# Patient Record
Sex: FEMALE
[De-identification: names, ages, dates, MRNs, and addresses within clinical notes are randomized per-mention and may not be internally consistent; named-entity substitution may affect disease eponyms.]

---

## 2022-10-19 PROBLEM — Z00.00 ENCOUNTER FOR PREVENTIVE HEALTH EXAMINATION: Status: ACTIVE | Noted: 2022-10-19

## 2022-11-02 ENCOUNTER — LABORATORY RESULT (OUTPATIENT)
Age: 57
End: 2022-11-02

## 2022-11-02 ENCOUNTER — APPOINTMENT (OUTPATIENT)
Dept: OBGYN | Facility: CLINIC | Age: 57
End: 2022-11-02

## 2022-11-02 VITALS
SYSTOLIC BLOOD PRESSURE: 108 MMHG | HEIGHT: 64 IN | DIASTOLIC BLOOD PRESSURE: 74 MMHG | BODY MASS INDEX: 26.98 KG/M2 | WEIGHT: 158 LBS

## 2022-11-02 DIAGNOSIS — J45.909 UNSPECIFIED ASTHMA, UNCOMPLICATED: ICD-10-CM

## 2022-11-02 DIAGNOSIS — N81.10 CYSTOCELE, UNSPECIFIED: ICD-10-CM

## 2022-11-02 DIAGNOSIS — E11.9 TYPE 2 DIABETES MELLITUS W/OUT COMPLICATIONS: ICD-10-CM

## 2022-11-02 DIAGNOSIS — E78.00 PURE HYPERCHOLESTEROLEMIA, UNSPECIFIED: ICD-10-CM

## 2022-11-02 DIAGNOSIS — R32 UNSPECIFIED URINARY INCONTINENCE: ICD-10-CM

## 2022-11-02 DIAGNOSIS — N95.2 POSTMENOPAUSAL ATROPHIC VAGINITIS: ICD-10-CM

## 2022-11-02 PROCEDURE — 99203 OFFICE O/P NEW LOW 30 MIN: CPT

## 2022-11-02 RX ORDER — ESTRADIOL 0.1 MG/G
0.1 CREAM VAGINAL
Qty: 1 | Refills: 3 | Status: ACTIVE | COMMUNITY
Start: 2022-11-02 | End: 1900-01-01

## 2022-11-03 PROBLEM — N81.10 FEMALE CYSTOCELE: Status: ACTIVE | Noted: 2022-11-03

## 2022-11-03 PROBLEM — R32 URINARY INCONTINENCE IN FEMALE: Status: ACTIVE | Noted: 2022-11-03

## 2022-11-03 NOTE — HISTORY OF PRESENT ILLNESS
[FreeTextEntry1] : pt comes for surgery . She has a long history of urinary stress incontinence with mild frequency and no loss of urine over night while sleeping . She also has tried pelvic floor exercises but was not doing them well for number of years . SHe also has vaginal dryness and sex is difficult because of the dryness . She has no back trauma , bleeding , bloating , hematuria or constipation .

## 2022-11-07 ENCOUNTER — LABORATORY RESULT (OUTPATIENT)
Age: 57
End: 2022-11-07

## 2022-11-21 ENCOUNTER — ASOB RESULT (OUTPATIENT)
Age: 57
End: 2022-11-21

## 2022-11-21 ENCOUNTER — APPOINTMENT (OUTPATIENT)
Dept: OBGYN | Facility: CLINIC | Age: 57
End: 2022-11-21

## 2022-11-21 PROCEDURE — 76830 TRANSVAGINAL US NON-OB: CPT

## 2024-10-29 ENCOUNTER — INPATIENT (INPATIENT)
Facility: HOSPITAL | Age: 59
LOS: 1 days | Discharge: HOME | DRG: 552 | End: 2024-10-31
Attending: STUDENT IN AN ORGANIZED HEALTH CARE EDUCATION/TRAINING PROGRAM | Admitting: STUDENT IN AN ORGANIZED HEALTH CARE EDUCATION/TRAINING PROGRAM
Payer: MEDICAID

## 2024-10-29 VITALS
RESPIRATION RATE: 18 BRPM | HEIGHT: 65 IN | HEART RATE: 100 BPM | WEIGHT: 162.04 LBS | OXYGEN SATURATION: 100 % | SYSTOLIC BLOOD PRESSURE: 151 MMHG | TEMPERATURE: 98 F | DIASTOLIC BLOOD PRESSURE: 91 MMHG

## 2024-10-29 LAB
ALBUMIN SERPL ELPH-MCNC: 4 G/DL — SIGNIFICANT CHANGE UP (ref 3.5–5)
ALP SERPL-CCNC: 67 U/L — SIGNIFICANT CHANGE UP (ref 40–120)
ALT FLD-CCNC: 26 U/L DA — SIGNIFICANT CHANGE UP (ref 10–60)
ANION GAP SERPL CALC-SCNC: 7 MMOL/L — SIGNIFICANT CHANGE UP (ref 5–17)
AST SERPL-CCNC: 25 U/L — SIGNIFICANT CHANGE UP (ref 10–40)
BASOPHILS # BLD AUTO: 0.03 K/UL — SIGNIFICANT CHANGE UP (ref 0–0.2)
BASOPHILS NFR BLD AUTO: 0.4 % — SIGNIFICANT CHANGE UP (ref 0–2)
BILIRUB SERPL-MCNC: 0.3 MG/DL — SIGNIFICANT CHANGE UP (ref 0.2–1.2)
BUN SERPL-MCNC: 30 MG/DL — HIGH (ref 7–18)
CALCIUM SERPL-MCNC: 10.3 MG/DL — SIGNIFICANT CHANGE UP (ref 8.4–10.5)
CHLORIDE SERPL-SCNC: 108 MMOL/L — SIGNIFICANT CHANGE UP (ref 96–108)
CO2 SERPL-SCNC: 25 MMOL/L — SIGNIFICANT CHANGE UP (ref 22–31)
CREAT SERPL-MCNC: 0.98 MG/DL — SIGNIFICANT CHANGE UP (ref 0.5–1.3)
EGFR: 66 ML/MIN/1.73M2 — SIGNIFICANT CHANGE UP
EOSINOPHIL # BLD AUTO: 0.16 K/UL — SIGNIFICANT CHANGE UP (ref 0–0.5)
EOSINOPHIL NFR BLD AUTO: 2.4 % — SIGNIFICANT CHANGE UP (ref 0–6)
GLUCOSE SERPL-MCNC: 116 MG/DL — HIGH (ref 70–99)
HCT VFR BLD CALC: 40.3 % — SIGNIFICANT CHANGE UP (ref 34.5–45)
HGB BLD-MCNC: 14.1 G/DL — SIGNIFICANT CHANGE UP (ref 11.5–15.5)
IMM GRANULOCYTES NFR BLD AUTO: 0.1 % — SIGNIFICANT CHANGE UP (ref 0–0.9)
LYMPHOCYTES # BLD AUTO: 3.18 K/UL — SIGNIFICANT CHANGE UP (ref 1–3.3)
LYMPHOCYTES # BLD AUTO: 47.1 % — HIGH (ref 13–44)
MCHC RBC-ENTMCNC: 30.7 PG — SIGNIFICANT CHANGE UP (ref 27–34)
MCHC RBC-ENTMCNC: 35 G/DL — SIGNIFICANT CHANGE UP (ref 32–36)
MCV RBC AUTO: 87.6 FL — SIGNIFICANT CHANGE UP (ref 80–100)
MONOCYTES # BLD AUTO: 0.55 K/UL — SIGNIFICANT CHANGE UP (ref 0–0.9)
MONOCYTES NFR BLD AUTO: 8.1 % — SIGNIFICANT CHANGE UP (ref 2–14)
NEUTROPHILS # BLD AUTO: 2.82 K/UL — SIGNIFICANT CHANGE UP (ref 1.8–7.4)
NEUTROPHILS NFR BLD AUTO: 41.9 % — LOW (ref 43–77)
NRBC # BLD: 0 /100 WBCS — SIGNIFICANT CHANGE UP (ref 0–0)
PLATELET # BLD AUTO: 255 K/UL — SIGNIFICANT CHANGE UP (ref 150–400)
POTASSIUM SERPL-MCNC: 3.9 MMOL/L — SIGNIFICANT CHANGE UP (ref 3.5–5.3)
POTASSIUM SERPL-SCNC: 3.9 MMOL/L — SIGNIFICANT CHANGE UP (ref 3.5–5.3)
PROT SERPL-MCNC: 7.6 G/DL — SIGNIFICANT CHANGE UP (ref 6–8.3)
RBC # BLD: 4.6 M/UL — SIGNIFICANT CHANGE UP (ref 3.8–5.2)
RBC # FLD: 12.2 % — SIGNIFICANT CHANGE UP (ref 10.3–14.5)
SODIUM SERPL-SCNC: 140 MMOL/L — SIGNIFICANT CHANGE UP (ref 135–145)
WBC # BLD: 6.75 K/UL — SIGNIFICANT CHANGE UP (ref 3.8–10.5)
WBC # FLD AUTO: 6.75 K/UL — SIGNIFICANT CHANGE UP (ref 3.8–10.5)

## 2024-10-29 PROCEDURE — 72131 CT LUMBAR SPINE W/O DYE: CPT | Mod: 26,MC

## 2024-10-29 PROCEDURE — 99285 EMERGENCY DEPT VISIT HI MDM: CPT

## 2024-10-29 RX ORDER — KETOROLAC TROMETHAMINE 30 MG/ML
15 INJECTION INTRAMUSCULAR; INTRAVENOUS ONCE
Refills: 0 | Status: DISCONTINUED | OUTPATIENT
Start: 2024-10-29 | End: 2024-10-29

## 2024-10-29 RX ORDER — METHOCARBAMOL 500 MG/1
1500 TABLET ORAL ONCE
Refills: 0 | Status: COMPLETED | OUTPATIENT
Start: 2024-10-29 | End: 2024-10-29

## 2024-10-29 RX ORDER — LIDOCAINE HYDROCHLORIDE 40 MG/ML
1 SOLUTION TOPICAL ONCE
Refills: 0 | Status: COMPLETED | OUTPATIENT
Start: 2024-10-29 | End: 2024-10-29

## 2024-10-29 RX ORDER — MORPHINE SULFATE 30 MG/1
4 TABLET, EXTENDED RELEASE ORAL ONCE
Refills: 0 | Status: DISCONTINUED | OUTPATIENT
Start: 2024-10-29 | End: 2024-10-29

## 2024-10-29 RX ORDER — ACETAMINOPHEN 500 MG
1000 TABLET ORAL ONCE
Refills: 0 | Status: COMPLETED | OUTPATIENT
Start: 2024-10-29 | End: 2024-10-29

## 2024-10-29 RX ADMIN — METHOCARBAMOL 1500 MILLIGRAM(S): 500 TABLET ORAL at 21:53

## 2024-10-29 RX ADMIN — LIDOCAINE HYDROCHLORIDE 1 PATCH: 40 SOLUTION TOPICAL at 21:53

## 2024-10-29 RX ADMIN — KETOROLAC TROMETHAMINE 15 MILLIGRAM(S): 30 INJECTION INTRAMUSCULAR; INTRAVENOUS at 21:53

## 2024-10-29 RX ADMIN — Medication 400 MILLIGRAM(S): at 23:46

## 2024-10-29 RX ADMIN — MORPHINE SULFATE 4 MILLIGRAM(S): 30 TABLET, EXTENDED RELEASE ORAL at 23:46

## 2024-10-29 NOTE — ED PROVIDER NOTE - OBJECTIVE STATEMENT
#[id number]    59-year-old female with past medical history asthma, GERD, hyperlipidemia presents with low back pain radiating down bilateral lower extremities, worsening over the past week.  Patient reports low back pain radiating down bilateral lower extremities right greater than left over the past few weeks, worsening of the past few days.  Patient states she saw her primary care doctor and was prescribed diclofenac with little improvement.  Denies any recent injury or trauma.  Denies any fevers, headache, vision change, chest pain, shortness of breath, abdominal pain, vomiting, diarrhea, dysuria, numbness, focal weakness, saddle anesthesia, bowel/bladder dysfunction, or rash.  Denies history of IV drug abuse.  Denies aspirin or anticoagulation use.  Denies any additional complaints.

## 2024-10-29 NOTE — ED PROVIDER NOTE - CLINICAL SUMMARY MEDICAL DECISION MAKING FREE TEXT BOX
Shaheen: 59-year-old female with past medical history asthma, GERD, hyperlipidemia presents with low back pain radiating down bilateral lower extremities, worsening over the past week.  Patient reports low back pain radiating down bilateral lower extremities right greater than left over the past few weeks, worsening of the past few days.  Patient states she saw her primary care doctor and was prescribed diclofenac with little improvement.  Denies any recent injury or trauma.  Denies any fevers, headache, vision change, chest pain, shortness of breath, abdominal pain, vomiting, diarrhea, dysuria, numbness, focal weakness, saddle anesthesia, bowel/bladder dysfunction, or rash.  Denies history of IV drug abuse.  Denies aspirin or anticoagulation use.  Physical exam per above. Likely lumbar radiculopathy, no signs of cauda equina syndrome or cord compression. Will obtain labs, imaging, provide supportive treatment with dispo pending workup.

## 2024-10-29 NOTE — ED PROVIDER NOTE - PHYSICAL EXAMINATION
CONSTITUTIONAL: non-toxic, uncomfortable appearing  SKIN: no rash, no petechiae.  EYES: PERRL, EOMI, pink conjunctiva, anicteric  ENT: tongue and uvular midline, no exudates, moist mucous membranes  NECK: Supple; no meningismus, no JVD  CARD: RRR, no murmurs, equal radial pulses bilaterally 2+  RESP: CTAB, no respiratory distress  ABD: Soft, non-tender, non-distended, no peritoneal signs  EXT: Normal ROM x4. No edema. DP 2+ bilaterally. Sensation grossly intact.   NEURO: Alert, oriented. Neuro exam nonfocal, equal strength bilaterally. Positive straight leg raise bilaterally.   PSYCH: Cooperative, appropriate.

## 2024-10-29 NOTE — ED PROVIDER NOTE - PROGRESS NOTE DETAILS
Carolyn-DO: Discussed labs/imaging results with patient.  Patient with persistent severe pain and difficulty ambulating despite multimodal analgesia.  Will admit, discussed with hospitalist and MAR regarding admission.  Patient agreeable with plan.

## 2024-10-30 DIAGNOSIS — Z98.891 HISTORY OF UTERINE SCAR FROM PREVIOUS SURGERY: Chronic | ICD-10-CM

## 2024-10-30 DIAGNOSIS — R26.2 DIFFICULTY IN WALKING, NOT ELSEWHERE CLASSIFIED: ICD-10-CM

## 2024-10-30 DIAGNOSIS — E11.9 TYPE 2 DIABETES MELLITUS WITHOUT COMPLICATIONS: ICD-10-CM

## 2024-10-30 DIAGNOSIS — K21.9 GASTRO-ESOPHAGEAL REFLUX DISEASE WITHOUT ESOPHAGITIS: ICD-10-CM

## 2024-10-30 DIAGNOSIS — E78.5 HYPERLIPIDEMIA, UNSPECIFIED: ICD-10-CM

## 2024-10-30 DIAGNOSIS — Z29.9 ENCOUNTER FOR PROPHYLACTIC MEASURES, UNSPECIFIED: ICD-10-CM

## 2024-10-30 DIAGNOSIS — M54.16 RADICULOPATHY, LUMBAR REGION: ICD-10-CM

## 2024-10-30 DIAGNOSIS — Z87.898 PERSONAL HISTORY OF OTHER SPECIFIED CONDITIONS: ICD-10-CM

## 2024-10-30 LAB
A1C WITH ESTIMATED AVERAGE GLUCOSE RESULT: 5.7 % — HIGH (ref 4–5.6)
ALBUMIN SERPL ELPH-MCNC: 3.7 G/DL — SIGNIFICANT CHANGE UP (ref 3.5–5)
ALP SERPL-CCNC: 58 U/L — SIGNIFICANT CHANGE UP (ref 40–120)
ALT FLD-CCNC: 25 U/L DA — SIGNIFICANT CHANGE UP (ref 10–60)
ANION GAP SERPL CALC-SCNC: 4 MMOL/L — LOW (ref 5–17)
AST SERPL-CCNC: 18 U/L — SIGNIFICANT CHANGE UP (ref 10–40)
BILIRUB SERPL-MCNC: 0.5 MG/DL — SIGNIFICANT CHANGE UP (ref 0.2–1.2)
BUN SERPL-MCNC: 25 MG/DL — HIGH (ref 7–18)
CALCIUM SERPL-MCNC: 9.8 MG/DL — SIGNIFICANT CHANGE UP (ref 8.4–10.5)
CHLORIDE SERPL-SCNC: 108 MMOL/L — SIGNIFICANT CHANGE UP (ref 96–108)
CO2 SERPL-SCNC: 29 MMOL/L — SIGNIFICANT CHANGE UP (ref 22–31)
CREAT SERPL-MCNC: 0.72 MG/DL — SIGNIFICANT CHANGE UP (ref 0.5–1.3)
EGFR: 96 ML/MIN/1.73M2 — SIGNIFICANT CHANGE UP
ESTIMATED AVERAGE GLUCOSE: 117 MG/DL — HIGH (ref 68–114)
GLUCOSE BLDC GLUCOMTR-MCNC: 101 MG/DL — HIGH (ref 70–99)
GLUCOSE BLDC GLUCOMTR-MCNC: 104 MG/DL — HIGH (ref 70–99)
GLUCOSE BLDC GLUCOMTR-MCNC: 105 MG/DL — HIGH (ref 70–99)
GLUCOSE BLDC GLUCOMTR-MCNC: 120 MG/DL — HIGH (ref 70–99)
GLUCOSE SERPL-MCNC: 109 MG/DL — HIGH (ref 70–99)
HCT VFR BLD CALC: 37.6 % — SIGNIFICANT CHANGE UP (ref 34.5–45)
HGB BLD-MCNC: 13 G/DL — SIGNIFICANT CHANGE UP (ref 11.5–15.5)
MAGNESIUM SERPL-MCNC: 2.2 MG/DL — SIGNIFICANT CHANGE UP (ref 1.6–2.6)
MCHC RBC-ENTMCNC: 30.7 PG — SIGNIFICANT CHANGE UP (ref 27–34)
MCHC RBC-ENTMCNC: 34.6 G/DL — SIGNIFICANT CHANGE UP (ref 32–36)
MCV RBC AUTO: 88.9 FL — SIGNIFICANT CHANGE UP (ref 80–100)
NRBC # BLD: 0 /100 WBCS — SIGNIFICANT CHANGE UP (ref 0–0)
PHOSPHATE SERPL-MCNC: 4.5 MG/DL — SIGNIFICANT CHANGE UP (ref 2.5–4.5)
PLATELET # BLD AUTO: 228 K/UL — SIGNIFICANT CHANGE UP (ref 150–400)
POTASSIUM SERPL-MCNC: 4.1 MMOL/L — SIGNIFICANT CHANGE UP (ref 3.5–5.3)
POTASSIUM SERPL-SCNC: 4.1 MMOL/L — SIGNIFICANT CHANGE UP (ref 3.5–5.3)
PROT SERPL-MCNC: 6.9 G/DL — SIGNIFICANT CHANGE UP (ref 6–8.3)
RBC # BLD: 4.23 M/UL — SIGNIFICANT CHANGE UP (ref 3.8–5.2)
RBC # FLD: 12.1 % — SIGNIFICANT CHANGE UP (ref 10.3–14.5)
SODIUM SERPL-SCNC: 141 MMOL/L — SIGNIFICANT CHANGE UP (ref 135–145)
WBC # BLD: 4.76 K/UL — SIGNIFICANT CHANGE UP (ref 3.8–10.5)
WBC # FLD AUTO: 4.76 K/UL — SIGNIFICANT CHANGE UP (ref 3.8–10.5)

## 2024-10-30 PROCEDURE — 99222 1ST HOSP IP/OBS MODERATE 55: CPT

## 2024-10-30 PROCEDURE — 72158 MRI LUMBAR SPINE W/O & W/DYE: CPT | Mod: 26

## 2024-10-30 PROCEDURE — 99223 1ST HOSP IP/OBS HIGH 75: CPT | Mod: GC

## 2024-10-30 RX ORDER — METHOCARBAMOL 500 MG/1
750 TABLET ORAL EVERY 8 HOURS
Refills: 0 | Status: DISCONTINUED | OUTPATIENT
Start: 2024-10-30 | End: 2024-10-31

## 2024-10-30 RX ORDER — ACETAMINOPHEN 500 MG
1000 TABLET ORAL EVERY 8 HOURS
Refills: 0 | Status: DISCONTINUED | OUTPATIENT
Start: 2024-10-30 | End: 2024-10-31

## 2024-10-30 RX ORDER — INSULIN LISPRO 100/ML
VIAL (ML) SUBCUTANEOUS AT BEDTIME
Refills: 0 | Status: DISCONTINUED | OUTPATIENT
Start: 2024-10-30 | End: 2024-10-31

## 2024-10-30 RX ORDER — GABAPENTIN 300 MG/1
300 CAPSULE ORAL THREE TIMES A DAY
Refills: 0 | Status: DISCONTINUED | OUTPATIENT
Start: 2024-10-30 | End: 2024-10-31

## 2024-10-30 RX ORDER — LIDOCAINE HYDROCHLORIDE 40 MG/ML
2 SOLUTION TOPICAL DAILY
Refills: 0 | Status: DISCONTINUED | OUTPATIENT
Start: 2024-10-30 | End: 2024-10-31

## 2024-10-30 RX ORDER — MORPHINE SULFATE 30 MG/1
4 TABLET, EXTENDED RELEASE ORAL EVERY 6 HOURS
Refills: 0 | Status: DISCONTINUED | OUTPATIENT
Start: 2024-10-30 | End: 2024-10-30

## 2024-10-30 RX ORDER — KETOROLAC TROMETHAMINE 30 MG/ML
15 INJECTION INTRAMUSCULAR; INTRAVENOUS ONCE
Refills: 0 | Status: DISCONTINUED | OUTPATIENT
Start: 2024-10-30 | End: 2024-10-30

## 2024-10-30 RX ORDER — INSULIN LISPRO 100/ML
VIAL (ML) SUBCUTANEOUS
Refills: 0 | Status: DISCONTINUED | OUTPATIENT
Start: 2024-10-30 | End: 2024-10-31

## 2024-10-30 RX ORDER — MORPHINE SULFATE 30 MG/1
2 TABLET, EXTENDED RELEASE ORAL ONCE
Refills: 0 | Status: DISCONTINUED | OUTPATIENT
Start: 2024-10-30 | End: 2024-10-30

## 2024-10-30 RX ORDER — MELATONIN 5 MG
3 TABLET ORAL AT BEDTIME
Refills: 0 | Status: DISCONTINUED | OUTPATIENT
Start: 2024-10-30 | End: 2024-10-31

## 2024-10-30 RX ORDER — ONDANSETRON HYDROCHLORIDE 2 MG/ML
4 INJECTION, SOLUTION INTRAMUSCULAR; INTRAVENOUS EVERY 8 HOURS
Refills: 0 | Status: DISCONTINUED | OUTPATIENT
Start: 2024-10-30 | End: 2024-10-31

## 2024-10-30 RX ORDER — METFORMIN HYDROCHLORIDE 500 MG/1
1 TABLET, EXTENDED RELEASE ORAL
Refills: 0 | DISCHARGE

## 2024-10-30 RX ORDER — OXYBUTYNIN CHLORIDE 5 MG/1
1 TABLET ORAL
Refills: 0 | DISCHARGE

## 2024-10-30 RX ORDER — PANTOPRAZOLE SODIUM 40 MG/1
40 TABLET, DELAYED RELEASE ORAL
Refills: 0 | Status: DISCONTINUED | OUTPATIENT
Start: 2024-10-30 | End: 2024-10-31

## 2024-10-30 RX ORDER — OXYCODONE HYDROCHLORIDE 30 MG/1
2.5 TABLET ORAL EVERY 4 HOURS
Refills: 0 | Status: DISCONTINUED | OUTPATIENT
Start: 2024-10-30 | End: 2024-10-31

## 2024-10-30 RX ORDER — FAMOTIDINE 10 MG/ML
40 INJECTION INTRAVENOUS DAILY
Refills: 0 | Status: DISCONTINUED | OUTPATIENT
Start: 2024-10-30 | End: 2024-10-31

## 2024-10-30 RX ORDER — ENOXAPARIN SODIUM 40MG/0.4ML
40 SYRINGE (ML) SUBCUTANEOUS EVERY 24 HOURS
Refills: 0 | Status: DISCONTINUED | OUTPATIENT
Start: 2024-10-30 | End: 2024-10-31

## 2024-10-30 RX ORDER — KETOROLAC TROMETHAMINE 30 MG/ML
15 INJECTION INTRAMUSCULAR; INTRAVENOUS EVERY 8 HOURS
Refills: 0 | Status: DISCONTINUED | OUTPATIENT
Start: 2024-10-30 | End: 2024-10-31

## 2024-10-30 RX ORDER — ACETAMINOPHEN 500 MG
650 TABLET ORAL EVERY 6 HOURS
Refills: 0 | Status: DISCONTINUED | OUTPATIENT
Start: 2024-10-30 | End: 2024-10-30

## 2024-10-30 RX ORDER — OXYCODONE HYDROCHLORIDE 30 MG/1
2.5 TABLET ORAL EVERY 4 HOURS
Refills: 0 | Status: DISCONTINUED | OUTPATIENT
Start: 2024-10-30 | End: 2024-10-30

## 2024-10-30 RX ORDER — OMEPRAZOLE 10 MG
1 CAPSULE,DELAYED RELEASE (ENTERIC COATED) ORAL
Refills: 0 | DISCHARGE

## 2024-10-30 RX ORDER — FAMOTIDINE 10 MG/ML
1 INJECTION INTRAVENOUS
Refills: 0 | DISCHARGE

## 2024-10-30 RX ADMIN — KETOROLAC TROMETHAMINE 15 MILLIGRAM(S): 30 INJECTION INTRAMUSCULAR; INTRAVENOUS at 02:51

## 2024-10-30 RX ADMIN — GABAPENTIN 300 MILLIGRAM(S): 300 CAPSULE ORAL at 21:33

## 2024-10-30 RX ADMIN — KETOROLAC TROMETHAMINE 15 MILLIGRAM(S): 30 INJECTION INTRAMUSCULAR; INTRAVENOUS at 15:20

## 2024-10-30 RX ADMIN — MORPHINE SULFATE 4 MILLIGRAM(S): 30 TABLET, EXTENDED RELEASE ORAL at 10:30

## 2024-10-30 RX ADMIN — LIDOCAINE HYDROCHLORIDE 1 PATCH: 40 SOLUTION TOPICAL at 08:24

## 2024-10-30 RX ADMIN — PANTOPRAZOLE SODIUM 40 MILLIGRAM(S): 40 TABLET, DELAYED RELEASE ORAL at 07:04

## 2024-10-30 RX ADMIN — KETOROLAC TROMETHAMINE 15 MILLIGRAM(S): 30 INJECTION INTRAMUSCULAR; INTRAVENOUS at 22:03

## 2024-10-30 RX ADMIN — Medication 10 MILLIGRAM(S): at 21:34

## 2024-10-30 RX ADMIN — GABAPENTIN 300 MILLIGRAM(S): 300 CAPSULE ORAL at 14:16

## 2024-10-30 RX ADMIN — GABAPENTIN 300 MILLIGRAM(S): 300 CAPSULE ORAL at 07:04

## 2024-10-30 RX ADMIN — METHOCARBAMOL 750 MILLIGRAM(S): 500 TABLET ORAL at 14:17

## 2024-10-30 RX ADMIN — Medication 1000 MILLIGRAM(S): at 15:20

## 2024-10-30 RX ADMIN — LIDOCAINE HYDROCHLORIDE 1 PATCH: 40 SOLUTION TOPICAL at 09:27

## 2024-10-30 RX ADMIN — LIDOCAINE HYDROCHLORIDE 2 PATCH: 40 SOLUTION TOPICAL at 14:16

## 2024-10-30 RX ADMIN — LIDOCAINE HYDROCHLORIDE 2 PATCH: 40 SOLUTION TOPICAL at 21:39

## 2024-10-30 RX ADMIN — Medication 1000 MILLIGRAM(S): at 22:03

## 2024-10-30 RX ADMIN — KETOROLAC TROMETHAMINE 15 MILLIGRAM(S): 30 INJECTION INTRAMUSCULAR; INTRAVENOUS at 21:33

## 2024-10-30 RX ADMIN — FAMOTIDINE 40 MILLIGRAM(S): 10 INJECTION INTRAVENOUS at 14:16

## 2024-10-30 RX ADMIN — Medication 3 MILLIGRAM(S): at 21:34

## 2024-10-30 RX ADMIN — Medication 1000 MILLIGRAM(S): at 21:33

## 2024-10-30 RX ADMIN — Medication 40 MILLIGRAM(S): at 17:20

## 2024-10-30 RX ADMIN — METHOCARBAMOL 750 MILLIGRAM(S): 500 TABLET ORAL at 21:32

## 2024-10-30 RX ADMIN — MORPHINE SULFATE 4 MILLIGRAM(S): 30 TABLET, EXTENDED RELEASE ORAL at 09:32

## 2024-10-30 RX ADMIN — KETOROLAC TROMETHAMINE 15 MILLIGRAM(S): 30 INJECTION INTRAMUSCULAR; INTRAVENOUS at 14:16

## 2024-10-30 RX ADMIN — LIDOCAINE HYDROCHLORIDE 2 PATCH: 40 SOLUTION TOPICAL at 19:54

## 2024-10-30 RX ADMIN — MORPHINE SULFATE 2 MILLIGRAM(S): 30 TABLET, EXTENDED RELEASE ORAL at 02:51

## 2024-10-30 RX ADMIN — Medication 1000 MILLIGRAM(S): at 14:16

## 2024-10-30 NOTE — PATIENT PROFILE ADULT - NSPROGENBLOODRESTRICT_GEN_A_NUR
none
Instructions: This plan will send the code FBSD to the PM system.  DO NOT or CHANGE the price.
Detail Level: Simple
Price (Do Not Change): 0.00

## 2024-10-30 NOTE — H&P ADULT - PROBLEM SELECTOR PLAN 2
p/w 1 month history of atraumatic lower back pain that radiates to B/L LE.   patient reports she is having difficulty walking   - PT consult

## 2024-10-30 NOTE — CONSULT NOTE ADULT - ASSESSMENT
Search Terms: Edel Da Silva, 1965Search Date: 10/30/2024 12:19:48 PM  The Drug Utilization Report below displays all of the controlled substance prescriptions, if any, that your patient has filled in the last twelve months. The information displayed on this report is compiled from pharmacy submissions to the Department, and accurately reflects the information as submitted by the pharmacies.    This report was requested by: Regina Ross | Reference #: [id number]    There are no results for the search terms that you entered.

## 2024-10-30 NOTE — H&P ADULT - NSHPPHYSICALEXAM_GEN_ALL_CORE
GENERAL: NAD, lying in bed comfortably   HEAD:  Atraumatic, Normocephalic  EYES: clear conjunctiva and  sclera   ENT: Moist mucous membranes  NECK: Supple  LUNG: Clear to auscultation bilaterally. No rales, wheezing,   HEART: Regular rate and rhythm; No murmurs,   ABDOMEN: Bowel sounds present; Soft, Nontender, Nondistended, Negative Rovsing's sign   EXTREMITIES:  2+ Peripheral Pulses, . No clubbing, cyanosis, or edema  NERVOUS SYSTEM:  AAOX3, speech clear. No deficits   CERVICAL: paraspinal muscle tenderness, no midline tenderness   THORACIC: No paraspinal tenderness, no midline tenderness   LUMBAR/SACRUM: paraspinal muscle tenderness, no midline tenderness, + Straight leg test b/l   SKIN: No rashes or lesions

## 2024-10-30 NOTE — H&P ADULT - NSICDXPASTMEDICALHX_GEN_ALL_CORE_FT
PAST MEDICAL HISTORY:  Diabetes mellitus     GERD (gastroesophageal reflux disease)     H/O urinary incontinence     HLD (hyperlipidemia)

## 2024-10-30 NOTE — H&P ADULT - HISTORY OF PRESENT ILLNESS
60 yo F with PMH of Asthma, GERD, DM, HLD, Urinary incontinence, present with 1 month history of  atraumatic intermittent lower back pain that radiates to  bilateral lower extremities ( R > L),  worsened over  the past few days. Accompanied with difficulty walking in the past few days. As per patient, she woke up with the pain and she describe  the pain as a  tingling sensation that radiates to her leg, Patient reports the pain worsens with bending over or lifting objects. 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 Patient reports seeing her PCP 1 month ago regarding the pain. Patient  was prescribed diclofenac with little improvement. Patient reports that she recently did an xray of bilateral knees which was negative for fracture. In addition, patient did a venous doppler of the right lower extremities which was negative for DVT.  Patient denie  fevers, headache, vision change, chest pain, shortness of breath,  vomiting, diarrhea, dysuria, numbness, focal weakness,  bowel/bladder dysfunction, Patient reports today she started having some pain in the hip    In the ED,   v/s: 151/91, , Temp 98.2, 100% RA   Repeat: 101/69, HR 65, 975 RA  Labs: Unremarkable   EK BPM, NSR  CT lumbar spine: Multilevel DJD, moderate right-sided neuroforaminal narrowing suggested at and L2-L3 and L3-L4.   s/p Tyleno, Toraldol, Lidocaine, Robaxin 1500mg, Morphine 4mg

## 2024-10-30 NOTE — PHYSICAL THERAPY INITIAL EVALUATION ADULT - GENERAL OBSERVATIONS, REHAB EVAL
Consult received,EMR, radiology and labs reviewed. Patient received supine in bed,  at bedside . Patient agreed to EVALUATION from Physical Therapist.

## 2024-10-30 NOTE — H&P ADULT - PROBLEM SELECTOR PLAN 1
p/w 1 month history of atraumatic lower back pain that radiates to B/L LE.   patient reports she is having difficulty walking   PE: Paraspinal tenderness in the lumbar region, + Straight leg test   CT lumbar spine showed Multilevel DJD, moderate right-sided neuroforaminal narrowing suggested at and L2-L3 and L3-L  - Pain med: Tylenol, Oxycodone 2.5mg, Morphine 4mg prn (mild, moderate, severe pain), Gabapentin 300mg TID   - PT consult p/w 1 month history of atraumatic lower back pain that radiates to B/L LE.   patient reports she is having difficulty walking   PE: Paraspinal tenderness in the lumbar region, + Straight leg test   CT lumbar spine showed Multilevel DJD, moderate right-sided neuroforaminal narrowing suggested at and L2-L3 and L3-L  - Pain med: Tylenol, Oxycodone 2.5mg, Morphine 4mg prn (mild, moderate, severe pain), Gabapentin 300mg TID   - PT consult  - Pain medicine consult in the AM  for pain control

## 2024-10-30 NOTE — ED ADULT NURSE NOTE - OBJECTIVE STATEMENT
Patient presents to ED reporting right lower back pain radiating to right leg, no weight bearing making it difficult to ambulate. Patient denies falling

## 2024-10-30 NOTE — PHYSICAL THERAPY INITIAL EVALUATION ADULT - ACTIVE RANGE OF MOTION EXAMINATION, REHAB EVAL
Right LE c/o of discomfort -unable to assess due pain , Left LE WFL AROM/bilateral upper extremity Active ROM was WFL (within functional limits)

## 2024-10-30 NOTE — ED ADULT NURSE NOTE - NSFALLUNIVINTERV_ED_ALL_ED
Bed/Stretcher in lowest position, wheels locked, appropriate side rails in place/Call bell, personal items and telephone in reach/Instruct patient to call for assistance before getting out of bed/chair/stretcher/Non-slip footwear applied when patient is off stretcher/[place] to call system/Physically safe environment - no spills, clutter or unnecessary equipment/Purposeful proactive rounding/Room/bathroom lighting operational, light cord in reach

## 2024-10-30 NOTE — PATIENT PROFILE ADULT - FALL HARM RISK - HARM RISK INTERVENTIONS

## 2024-10-30 NOTE — H&P ADULT - ASSESSMENT
60 yo F with PMH of Asthma, GERD, DM, HLD, Urinary incontinence, present with 1 month history of  atraumatic intermittent lower back pain that radiates to  bilateral lower extremities ( R > L),  worsened over  the past few days. Accompanied with difficulty walking in the past few days. CT lumbar spine showed Multilevel DJD, moderate right-sided neuroforaminal narrowing suggested at and L2-L3 and L3-L4. Admitted to medicine for lumbar radiculopathy and ambulatory dysfunction

## 2024-10-30 NOTE — H&P ADULT - NSHPREVIEWOFSYSTEMS_GEN_ALL_CORE
REVIEW OF SYSTEMS:  CONSTITUTIONAL: No fevers or chills  EYES: No conjunctiva redness, No eye discharge  ENT: No nasal congestion, No sore throat, No ear pain,   NECK: No pain or stiffness  RESPIRATORY: No cough, SOB,  wheezing, hemoptysis  CARDIOVASCULAR: No chest pain or  palpitations  GASTROINTESTINAL: No abdominal pain. No nausea, vomiting, diarrhea or constipation.  GENITOURINARY: No dysuria, frequency or hematuria  NEUROLOGICAL: + tinglign sensation, No headache,  MSK: = back pain   SKIN: No itching, rashes,   All other review of systems is negative unless indicated above.

## 2024-10-30 NOTE — CONSULT NOTE ADULT - PROBLEM SELECTOR RECOMMENDATION 9
Pt with acute low back pain with RLE radiculopathy which is somatic and neuropathic in nature likely due to sciatica. CTLS demonstrates No acute traumatic injury to the lumbar spine. Multilevel degenerative changes, described in detail above, with moderate right-sided neuroforaminal narrowing suggested at and L2-L3 and L3-L4.  Opioid pain recommendations   - Discontinue Morphine 4mg IVP q 6hrs PRN for severe pain. IV opioids not recommended  - Modify oxycodone to 2.5mg PO q 4hrs PRN for severe pain. Monitor for respiratory depression and sedation  Non-opioid pain recommendations   - Start Toradol 15mg IVP q 8 hours x 3 days. Monitor renal function  - Start Acetaminophen 1 gram PO q 8 hours x 3 days. Monitor LFTs  - Start Robaxin 750mg PO q 8hrs x 5 days. Monitor for sedation   - Continue Gabapentin 300 mg po q 8 hours. Monitor renal function.   - Start Lidoderm 4% patch daily x 2. (12 hrs on/12 hrs off)  Bowel Regimen  - Continue Miralax 17G PO daily  - Continue Senna 2 tablets at bedtime for constipation  Mild pain (score 1-3)  - Non-pharmacological pain treatment recommendations  - Warm/ Cool packs PRN   - Repositioning extremity, elevation, imagery, relaxation, distraction.  - Physical therapy OOB if no contraindications   Recommendations discussed with primary team and RN

## 2024-10-30 NOTE — CHART NOTE - NSCHARTNOTEFT_GEN_A_CORE
EVENT:  Pt seen     SUBJECTIVE:  Pt denies HA, SOB, CP, abdominal pain, nausea, vomiting, new bowel/bladder incontinence.  Reports continued lower back pain.  Reports yesterday her back pain was 10/10, today it's 8/10.  Reports lower back pain is constant, however relieved while she's working, but returns when she's back home and sitting on couch.  Reports back pain radiates to right lower leg, and constant.  Reports back pain radiates to LLE but intermittent.  Reports back pain radiates to back of leg (posterior) and feet.  Reports walking w/o assistance.  -Thao # [id number].    OBJECTIVE  REVIEW OF SYSTEMS:    CONSTITUTIONAL: No fever  EYES: No acute visual disturbances  NECK: No pain or stiffness  RESPIRATORY: No cough; No shortness of breath  CARDIOVASCULAR: No chest pain, no palpitations  GASTROINTESTINAL: No pain. No nausea or vomiting.  No diarrhea   NEUROLOGICAL: No headache or numbness, no tremors  MUSCULOSKELETAL: (+) Back pain as described above   GENITOURINARY: No dysuria, no frequency, no hesitancy.  (+) Chronic incontinence on Oxybutynin.    PSYCHIATRY: No depression, no anxiety  ALL OTHER  ROS negative      PHYSICAL EXAM:  GENERAL: NAD  HEENT: Normocephalic;  conjunctivae and sclerae clear; moist mucous membranes;   NECK : supple  CHEST/LUNG: Clear to auscultation bilaterally with good air entry   HEART: S1 S2  regular; no murmurs, gallops or rubs  ABDOMEN: Soft, Nontender, Nondistended; Bowel sounds present x 4 quad  EXTREMITIES: No cyanosis; no edema; no calf tenderness.  No saddle anesthesia.  Straight leg test limited d/t pain.    SKIN: Warm and dry; no rash  NERVOUS SYSTEM:  Awake and alert; Oriented  to place, person and time ; no new deficits      Vital Signs Last 24 Hrs  T(C): 36.6 (30 Oct 2024 08:16), Max: 36.8 (29 Oct 2024 20:21)  T(F): 97.8 (30 Oct 2024 08:16), Max: 98.2 (29 Oct 2024 20:21)  HR: 63 (30 Oct 2024 08:16) (60 - 100)  BP: 107/66 (30 Oct 2024 08:16) (98/61 - 151/91)  BP(mean): 73 (30 Oct 2024 05:31) (73 - 82)  RR: 18 (30 Oct 2024 08:16) (18 - 18)  SpO2: 97% (30 Oct 2024 08:16) (96% - 100%)    Parameters below as of 30 Oct 2024 08:16  Patient On (Oxygen Delivery Method): room air        LABS:                        13.0   4.76  )-----------( 228      ( 30 Oct 2024 05:00 )             37.6     10-30    141  |  108  |  25[H]  ----------------------------<  109[H]  4.1   |  29  |  0.72    Ca    9.8      30 Oct 2024 05:00  Phos  4.5     10-30  Mg     2.2     10-30    TPro  6.9  /  Alb  3.7  /  TBili  0.5  /  DBili  x   /  AST  18  /  ALT  25  /  AlkPhos  58  10-30      EKG:   IMAGING:    ASSESSMENT:  HPI:  58 yo, F, from home, with PMH of Asthma, GERD, DM, HLD, & Urinary incontinence.  Present with 1 month history of  atraumatic intermittent lower back pain that radiates to  bilateral lower extremities ( R > L),  worsened over  the past few days. Accompanied with difficulty walking in the past few days.  Admitted for Lumbar radiculopathy resulting in ambulatory dysfunction.        Problem/Plan - 1:  ·  Problem: Abnormal CT.   ·  Plan:  - CT L. Spine showed a 1.8cm sclerotic lesion proximal to L. Iliac bone abutting the L. Sacroiliac joint  - MR. L. Spine pending-f/u results   - Rest below     Problem/Plan - 2:  ·  Problem: Lumbar radiculopathy.   ·  Plan:   - P/w Lower back pain that radiates to B/L LE and difficulty walking  x 1 month, atraumatic    - Severe intractable low back pain with b/l radiculopathy  - CT L. Spine showed moderate right-sided neuroforaminal narrowing suggested at and L2-L3 and L3-L4  - PE: No saddle anesthesia and no new bowel/bladder dysfxn   - Maintain fall precaution   - C/w Pain mgmt   - PT pending-f/u rec's   - Pain Mgmt consult pending-f/u rec's      Problem/Plan - 3:  ·  Problem: Ambulatory dysfunction.   ·  Plan:   - See above      Problem/Plan - 4:  ·  Problem: HLD (hyperlipidemia).   ·  Plan:   H/o HLD on Atorvastatin   - C/w Atorvastatin      Problem/Plan - 5:  ·  Problem: DM (diabetes mellitus).   ·  Plan:   H/o DM on Metformin   - A1c pending-f/u results   - C/w HSS      Problem/Plan - 6:  ·  Problem: GERD (gastroesophageal reflux disease).   ·  Plan:    GERD on Omeprazole &  Famotidine 40mg  - C/w Hospital exchange-Protonix  - Resumed Famotidine      Problem/Plan - 7:  ·  Problem: H/O urinary incontinence.   ·  Plan:   H/o Urinary incontinence on Oxybutynin    - C/w Oxybutynin      Problem/Plan - 8:  ·  Problem: Prophylactic measure.   ·  Plan:   - C/w Lovenox.  - C/w Protonix & Famotidine EVENT:  Pt seen     SUBJECTIVE:  Pt denies HA, SOB, CP, abdominal pain, nausea, vomiting, or new bowel/bladder incontinence.  Reports continued lower back pain.  Reports yesterday her back pain was 10/10, today it's 8/10.  Reports lower back pain is constant, however relieved while she's working, but returns when she's back home and sitting on couch.  Reports back pain radiates to right lower leg, and constant.  Reports back pain radiates to LLE but intermittent.  Reports back pain radiates to back of leg (posterior) and feet.  Reports walking w/o assistance.  -Thao # [id number].    OBJECTIVE  REVIEW OF SYSTEMS:    CONSTITUTIONAL: No fever  EYES: No acute visual disturbances  NECK: No pain or stiffness  RESPIRATORY: No cough; No shortness of breath  CARDIOVASCULAR: No chest pain, no palpitations  GASTROINTESTINAL: No pain. No nausea or vomiting.  No diarrhea   NEUROLOGICAL: No headache or numbness, no tremors  MUSCULOSKELETAL: (+) Back pain as described above   GENITOURINARY: No dysuria, no frequency, no hesitancy.  (+) Chronic incontinence on Oxybutynin.    PSYCHIATRY: No depression, no anxiety  ALL OTHER  ROS negative      PHYSICAL EXAM:  GENERAL: NAD  HEENT: Normocephalic;  conjunctivae and sclerae clear; moist mucous membranes;   NECK : supple  CHEST/LUNG: Clear to auscultation bilaterally with good air entry   HEART: S1 S2  regular; no murmurs, gallops or rubs  ABDOMEN: Soft, Nontender, Nondistended; Bowel sounds present x 4 quad  EXTREMITIES: No cyanosis; no edema; no calf tenderness.  No saddle anesthesia.  Straight leg test limited d/t pain.    SKIN: Warm and dry; no rash  NERVOUS SYSTEM:  Awake and alert; Oriented  to place, person and time ; no new deficits      Vital Signs Last 24 Hrs  T(C): 36.6 (30 Oct 2024 08:16), Max: 36.8 (29 Oct 2024 20:21)  T(F): 97.8 (30 Oct 2024 08:16), Max: 98.2 (29 Oct 2024 20:21)  HR: 63 (30 Oct 2024 08:16) (60 - 100)  BP: 107/66 (30 Oct 2024 08:16) (98/61 - 151/91)  BP(mean): 73 (30 Oct 2024 05:31) (73 - 82)  RR: 18 (30 Oct 2024 08:16) (18 - 18)  SpO2: 97% (30 Oct 2024 08:16) (96% - 100%)    Parameters below as of 30 Oct 2024 08:16  Patient On (Oxygen Delivery Method): room air        LABS:                        13.0   4.76  )-----------( 228      ( 30 Oct 2024 05:00 )             37.6     10-30    141  |  108  |  25[H]  ----------------------------<  109[H]  4.1   |  29  |  0.72    Ca    9.8      30 Oct 2024 05:00  Phos  4.5     10-30  Mg     2.2     10-30    TPro  6.9  /  Alb  3.7  /  TBili  0.5  /  DBili  x   /  AST  18  /  ALT  25  /  AlkPhos  58  10-30      EKG:   IMAGING:    ASSESSMENT:  HPI:  58 yo, F, from home, with PMH of Asthma, GERD, DM, HLD, & Urinary incontinence.  Present with 1 month history of  atraumatic intermittent lower back pain that radiates to  bilateral lower extremities ( R > L),  worsened over  the past few days. Accompanied with difficulty walking in the past few days.  Admitted for Lumbar radiculopathy resulting in ambulatory dysfunction.        Problem/Plan - 1:  ·  Problem: Abnormal CT.   ·  Plan:  - CT L. Spine showed a 1.8cm sclerotic lesion proximal to L. Iliac bone abutting the L. Sacroiliac joint  - MR. L. Spine pending-f/u results   - Rest below     Problem/Plan - 2:  ·  Problem: Lumbar radiculopathy.   ·  Plan:   - P/w Lower back pain that radiates to B/L LE and difficulty walking  x 1 month, atraumatic    - Severe intractable low back pain with b/l radiculopathy  - CT L. Spine showed moderate right-sided neuroforaminal narrowing suggested at and L2-L3 and L3-L4  - PE: No saddle anesthesia and no new bowel/bladder dysfxn   - Maintain fall precaution   - C/w Pain mgmt   - PT pending-f/u rec's   - Pain Mgmt consult pending-f/u rec's      Problem/Plan - 3:  ·  Problem: Ambulatory dysfunction.   ·  Plan:   - See above      Problem/Plan - 4:  ·  Problem: HLD (hyperlipidemia).   ·  Plan:   H/o HLD on Atorvastatin   - C/w Atorvastatin      Problem/Plan - 5:  ·  Problem: DM (diabetes mellitus).   ·  Plan:   H/o DM on Metformin   - A1c pending-f/u results   - C/w HSS      Problem/Plan - 6:  ·  Problem: GERD (gastroesophageal reflux disease).   ·  Plan:    GERD on Omeprazole &  Famotidine 40mg  - C/w Hospital exchange-Protonix  - Resumed Famotidine      Problem/Plan - 7:  ·  Problem: H/O urinary incontinence.   ·  Plan:   H/o Urinary incontinence on Oxybutynin    - C/w Oxybutynin      Problem/Plan - 8:  ·  Problem: Prophylactic measure.   ·  Plan:   - C/w Lovenox.  - C/w Protonix & Famotidine EVENT:  Pt seen     SUBJECTIVE:  Pt denies HA, SOB, CP, abdominal pain, nausea, vomiting, or new bowel/bladder incontinence.  Reports continued lower back pain.  Reports yesterday her back pain was 10/10, today it's 8/10.  Reports lower back pain is constant, however relieved while she's working, but returns when she's back home and sitting on couch.  Reports back pain radiates to right lower leg, and constant.  Reports back pain radiates to LLE but intermittent.  Reports back pain radiates to back of leg (posterior) and feet.  Reports walking w/o assistance.  -Thao # [id number].    OBJECTIVE  REVIEW OF SYSTEMS:    CONSTITUTIONAL: No fever  EYES: No acute visual disturbances  NECK: No pain or stiffness  RESPIRATORY: No cough; No shortness of breath  CARDIOVASCULAR: No chest pain, no palpitations  GASTROINTESTINAL: No pain. No nausea or vomiting.  No diarrhea   NEUROLOGICAL: No headache or numbness, no tremors  MUSCULOSKELETAL: (+) Back pain as described above   GENITOURINARY: No dysuria, no frequency, no hesitancy.  (+) Chronic incontinence on Oxybutynin.    PSYCHIATRY: No depression, no anxiety  ALL OTHER  ROS negative      PHYSICAL EXAM:  GENERAL: NAD  HEENT: Normocephalic;  conjunctivae and sclerae clear; moist mucous membranes;   NECK : supple  CHEST/LUNG: Clear to auscultation bilaterally with good air entry   HEART: S1 S2  regular; no murmurs, gallops or rubs  ABDOMEN: Soft, Nontender, Nondistended; Bowel sounds present x 4 quad  EXTREMITIES: No cyanosis; no edema; no calf tenderness.  No saddle anesthesia.  Straight leg test limited d/t pain.    SKIN: Warm and dry; no rash  NERVOUS SYSTEM:  Awake and alert; Oriented  to place, person and time ; no new deficits      Vital Signs Last 24 Hrs  T(C): 36.6 (30 Oct 2024 08:16), Max: 36.8 (29 Oct 2024 20:21)  T(F): 97.8 (30 Oct 2024 08:16), Max: 98.2 (29 Oct 2024 20:21)  HR: 63 (30 Oct 2024 08:16) (60 - 100)  BP: 107/66 (30 Oct 2024 08:16) (98/61 - 151/91)  BP(mean): 73 (30 Oct 2024 05:31) (73 - 82)  RR: 18 (30 Oct 2024 08:16) (18 - 18)  SpO2: 97% (30 Oct 2024 08:16) (96% - 100%)    Parameters below as of 30 Oct 2024 08:16  Patient On (Oxygen Delivery Method): room air        LABS:                        13.0   4.76  )-----------( 228      ( 30 Oct 2024 05:00 )             37.6     10-30    141  |  108  |  25[H]  ----------------------------<  109[H]  4.1   |  29  |  0.72    Ca    9.8      30 Oct 2024 05:00  Phos  4.5     10-30  Mg     2.2     10-30    TPro  6.9  /  Alb  3.7  /  TBili  0.5  /  DBili  x   /  AST  18  /  ALT  25  /  AlkPhos  58  10-30      EKG:   IMAGING:    ASSESSMENT:  HPI:  60 yo, F, from home, with PMH of Asthma, GERD, DM, HLD, & Urinary incontinence.  Present with 1 month history of  atraumatic intermittent lower back pain that radiates to  bilateral lower extremities ( R > L),  worsened over  the past few days. Accompanied with difficulty walking in the past few days.  Admitted for Lumbar radiculopathy resulting in ambulatory dysfunction.        Problem/Plan - 1:  ·  Problem: Abnormal CT.   ·  Plan:  - CT L. Spine showed a 1.8cm sclerotic lesion proximal to L. Iliac bone abutting the L. Sacroiliac joint  - MR. L. Spine pending-f/u results   - Rest below     Problem/Plan - 2:  ·  Problem: Lumbar radiculopathy.   ·  Plan:   - P/w Lower back pain that radiates to b/l LE and difficulty walking  x 1 month, atraumatic    - Severe intractable low back pain with b/l radiculopathy  - CT L. Spine showed moderate right-sided neuroforaminal narrowing suggested at and L2-L3 and L3-L4  - PE: No saddle anesthesia and no new bowel/bladder dysfxn   - Maintain fall precaution   - C/w Pain mgmt   - PT pending-f/u rec's   - Pain Mgmt consult pending-f/u rec's      Problem/Plan - 3:  ·  Problem: Ambulatory dysfunction.   ·  Plan:   - See above      Problem/Plan - 4:  ·  Problem: HLD (hyperlipidemia).   ·  Plan:   H/o HLD on Atorvastatin   - C/w Atorvastatin      Problem/Plan - 5:  ·  Problem: DM (diabetes mellitus).   ·  Plan:   H/o DM on Metformin   - A1c pending-f/u results   - C/w HSS      Problem/Plan - 6:  ·  Problem: GERD (gastroesophageal reflux disease).   ·  Plan:    GERD on Omeprazole &  Famotidine 40mg  - C/w Hospital exchange-Protonix  - Resumed Famotidine      Problem/Plan - 7:  ·  Problem: H/O urinary incontinence.   ·  Plan:   H/o Urinary incontinence on Oxybutynin    - C/w Oxybutynin      Problem/Plan - 8:  ·  Problem: Prophylactic measure.   ·  Plan:   - C/w Lovenox.  - C/w Protonix & Famotidine EVENT:  Pt seen     SUBJECTIVE:  Pt denies HA, SOB, CP, abdominal pain, nausea, vomiting, or new bowel/bladder incontinence.  Reports continued lower back pain.  Reports yesterday her back pain was 10/10, today it's 8/10.  Reports lower back pain is constant, however relieved while she's working, but returns when she's back home and sitting on couch.  Reports back pain radiates to RLE, and constant.  Reports back pain radiates to LLE but intermittent.  Reports back pain radiates to b/l back of leg (posterior) and feet.  Reports walking w/o assistance.  -Thao # [id number].    OBJECTIVE  REVIEW OF SYSTEMS:    CONSTITUTIONAL: No fever  EYES: No acute visual disturbances  NECK: No pain or stiffness  RESPIRATORY: No cough; No shortness of breath  CARDIOVASCULAR: No chest pain, no palpitations  GASTROINTESTINAL: No pain. No nausea or vomiting.  No diarrhea   NEUROLOGICAL: No headache or numbness, no tremors  MUSCULOSKELETAL: (+) Back pain as described above   GENITOURINARY: No dysuria, no frequency, no hesitancy.  (+) Chronic incontinence on Oxybutynin.    PSYCHIATRY: No depression, no anxiety  ALL OTHER  ROS negative      PHYSICAL EXAM:  GENERAL: NAD  HEENT: Normocephalic;  conjunctivae and sclerae clear; moist mucous membranes;   NECK : supple  CHEST/LUNG: Clear to auscultation bilaterally with good air entry   HEART: S1 S2  regular; no murmurs, gallops or rubs  ABDOMEN: Soft, Nontender, Nondistended; Bowel sounds present x 4 quad  EXTREMITIES: No cyanosis; no edema; no calf tenderness.  No saddle anesthesia.  Straight leg test limited d/t pain.    SKIN: Warm and dry; no rash  NERVOUS SYSTEM:  Awake and alert; Oriented  to place, person and time ; no new deficits      Vital Signs Last 24 Hrs  T(C): 36.6 (30 Oct 2024 08:16), Max: 36.8 (29 Oct 2024 20:21)  T(F): 97.8 (30 Oct 2024 08:16), Max: 98.2 (29 Oct 2024 20:21)  HR: 63 (30 Oct 2024 08:16) (60 - 100)  BP: 107/66 (30 Oct 2024 08:16) (98/61 - 151/91)  BP(mean): 73 (30 Oct 2024 05:31) (73 - 82)  RR: 18 (30 Oct 2024 08:16) (18 - 18)  SpO2: 97% (30 Oct 2024 08:16) (96% - 100%)    Parameters below as of 30 Oct 2024 08:16  Patient On (Oxygen Delivery Method): room air        LABS:                        13.0   4.76  )-----------( 228      ( 30 Oct 2024 05:00 )             37.6     10-30    141  |  108  |  25[H]  ----------------------------<  109[H]  4.1   |  29  |  0.72    Ca    9.8      30 Oct 2024 05:00  Phos  4.5     10-30  Mg     2.2     10-30    TPro  6.9  /  Alb  3.7  /  TBili  0.5  /  DBili  x   /  AST  18  /  ALT  25  /  AlkPhos  58  10-30      EKG:   IMAGING:    ASSESSMENT:  HPI:  60 yo, F, from home, with PMH of Asthma, GERD, DM, HLD, & Urinary incontinence.  Present with 1 month history of  atraumatic intermittent lower back pain that radiates to  bilateral lower extremities ( R > L),  worsened over  the past few days. Accompanied with difficulty walking in the past few days.  Admitted for Lumbar radiculopathy resulting in ambulatory dysfunction.        Problem/Plan - 1:  ·  Problem: Abnormal CT.   ·  Plan:  - CT L. Spine showed a 1.8cm sclerotic lesion proximal to L. Iliac bone abutting the L. Sacroiliac joint  - MR. L. Spine pending-f/u results   - Rest below     Problem/Plan - 2:  ·  Problem: Lumbar radiculopathy.   ·  Plan:   - P/w Lower back pain that radiates to b/l LE and difficulty walking  x 1 month, atraumatic    - Severe intractable low back pain with b/l radiculopathy  - CT L. Spine showed moderate right-sided neuroforaminal narrowing suggested at and L2-L3 and L3-L4  - PE: No saddle anesthesia and no new bowel/bladder dysfxn   - Maintain fall precaution   - C/w Pain mgmt   - PT pending-f/u rec's   - Pain Mgmt consult pending-f/u rec's      Problem/Plan - 3:  ·  Problem: Ambulatory dysfunction.   ·  Plan:   - See above      Problem/Plan - 4:  ·  Problem: HLD (hyperlipidemia).   ·  Plan:   H/o HLD on Atorvastatin   - C/w Atorvastatin      Problem/Plan - 5:  ·  Problem: DM (diabetes mellitus).   ·  Plan:   H/o DM on Metformin   - A1c pending-f/u results   - C/w HSS      Problem/Plan - 6:  ·  Problem: GERD (gastroesophageal reflux disease).   ·  Plan:    GERD on Omeprazole &  Famotidine 40mg  - C/w Hospital exchange-Protonix  - Resumed Famotidine      Problem/Plan - 7:  ·  Problem: H/O urinary incontinence.   ·  Plan:   H/o Urinary incontinence on Oxybutynin    - C/w Oxybutynin      Problem/Plan - 8:  ·  Problem: Prophylactic measure.   ·  Plan:   - C/w Lovenox.  - C/w Protonix & Famotidine

## 2024-10-30 NOTE — CONSULT NOTE ADULT - SUBJECTIVE AND OBJECTIVE BOX
Source of information: PATRICIO MCGREGOR, Chart review  Patient language: Bhutanese  : Lisandra [id number]    HPI:  60 yo F with PMH of Asthma, GERD, DM, HLD, Urinary incontinence, present with 1 month history of  atraumatic intermittent lower back pain that radiates to  bilateral lower extremities ( R > L),  worsened over  the past few days. Accompanied with difficulty walking in the past few days. As per patient, she woke up with the pain and she describe  the pain as a  tingling sensation that radiates to her leg, Patient reports the pain worsens with bending over or lifting objects. 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 Patient reports seeing her PCP 1 month ago regarding the pain. Patient  was prescribed diclofenac with little improvement. Patient reports that she recently did an xray of bilateral knees which was negative for fracture. In addition, patient did a venous doppler of the right lower extremities which was negative for DVT.  Patient denie  fevers, headache, vision change, chest pain, shortness of breath,  vomiting, diarrhea, dysuria, numbness, focal weakness,  bowel/bladder dysfunction, Patient reports today she started having some pain in the hip    In the ED,   v/s: 151/91, , Temp 98.2, 100% RA   Repeat: 101/69, HR 65, 975 RA  Labs: Unremarkable   EK BPM, NSR  CT lumbar spine: Multilevel DJD, moderate right-sided neuroforaminal narrowing suggested at and L2-L3 and L3-L4.   s/p Tyleno, Toraldol, Lidocaine, Robaxin 1500mg, Morphine 4mg   (30 Oct 2024 04:53)    Pt is admitted for intractable back pain with ambulatory dysfunction. CTLS demonstrates No acute traumatic injury to the lumbar spine. Multilevel degenerative changes, described in detail above, with moderate right-sided neuroforaminal narrowing suggested at and L2-L3 and L3-L4. Pain service consulted 10/30 for acute low back pain. Pt seen and examined at bedside, this morning. At time of assessment, patient laying in bed in NAD reports pain score 6/10 SCALE USED: (1-10 VNRS). Per patient, she began having bilateral lower extremity pain RLE > LLE and swelling one month ago, denies any trauma or known precipitating factors. Patient states she had an ultrasound doppler which was negative for DVTs. Patient reports new onset low back and right hip pain started last night causing extreme pain down the RLE with ambulatory dysfunction. Pt describes pain as localized to low back and right hip radiating down right lower extremity accompanied by numbness and tingling. She denies new bowel/bladder dysfunction but reports history of stress urinary incontinence and constipation. The pain is currently only mildly alleviated by pain medication and is exacerbated by movement. Pt tolerating PO diet. Denies lethargy, chest pain, SOB, nausea, vomiting, constipation. Reports last BM 10/29.  Patient stated goal for pain control: to be able to take deep breaths, get out of bed to chair and ambulate with tolerable pain control. Pt reports taking Tylenol and Aleve at home for pain.      PAST MEDICAL & SURGICAL HISTORY:  GERD (gastroesophageal reflux disease)    H/O urinary incontinence    Diabetes mellitus    HLD (hyperlipidemia)    History of     FAMILY HISTORY:  No pertinent family history in first degree relatives      Social History:  [x] Denies ETOH use, illicit drug use and smoking    Allergies    No Known Allergies    Intolerances    MEDICATIONS  (STANDING):  acetaminophen     Tablet .. 1000 milliGRAM(s) Oral every 8 hours  atorvastatin 10 milliGRAM(s) Oral at bedtime  enoxaparin Injectable 40 milliGRAM(s) SubCutaneous every 24 hours  famotidine    Tablet 40 milliGRAM(s) Oral daily  gabapentin 300 milliGRAM(s) Oral three times a day  insulin lispro (ADMELOG) corrective regimen sliding scale   SubCutaneous three times a day before meals  insulin lispro (ADMELOG) corrective regimen sliding scale   SubCutaneous at bedtime  ketorolac   Injectable 15 milliGRAM(s) IV Push every 8 hours  lidocaine   4% Patch 2 Patch Transdermal daily  methocarbamol 750 milliGRAM(s) Oral every 8 hours  pantoprazole    Tablet 40 milliGRAM(s) Oral before breakfast    MEDICATIONS  (PRN):  melatonin 3 milliGRAM(s) Oral at bedtime PRN Insomnia  ondansetron Injectable 4 milliGRAM(s) IV Push every 8 hours PRN Nausea and/or Vomiting  oxyCODONE    IR 2.5 milliGRAM(s) Oral every 4 hours PRN Severe Pain (7 - 10)    Vital Signs Last 24 Hrs  T(C): 36.6 (30 Oct 2024 08:16), Max: 36.8 (29 Oct 2024 20:21)  T(F): 97.8 (30 Oct 2024 08:16), Max: 98.2 (29 Oct 2024 20:21)  HR: 63 (30 Oct 2024 08:16) (60 - 100)  BP: 107/66 (30 Oct 2024 08:16) (98/61 - 151/91)  BP(mean): 73 (30 Oct 2024 05:31) (73 - 82)  RR: 18 (30 Oct 2024 08:16) (18 - 18)  SpO2: 97% (30 Oct 2024 08:16) (96% - 100%)    Parameters below as of 30 Oct 2024 08:16  Patient On (Oxygen Delivery Method): room air    LABS: Reviewed.               13.0   4.76  )-----------( 228      ( 30 Oct 2024 05:00 )             37.6     10-30    141  |  108  |  25[H]  ----------------------------<  109[H]  4.1   |  29  |  0.72    Ca    9.8      30 Oct 2024 05:00  Phos  4.5     10-30  Mg     2.2     10-30    TPro  6.9  /  Alb  3.7  /  TBili  0.5  /  DBili  x   /  AST  18  /  ALT  25  /  AlkPhos  58  10-30    LIVER FUNCTIONS - ( 30 Oct 2024 05:00 )  Alb: 3.7 g/dL / Pro: 6.9 g/dL / ALK PHOS: 58 U/L / ALT: 25 U/L DA / AST: 18 U/L / GGT: x           Urinalysis Basic - ( 30 Oct 2024 05:00 )    Color: x / Appearance: x / SG: x / pH: x  Gluc: 109 mg/dL / Ketone: x  / Bili: x / Urobili: x   Blood: x / Protein: x / Nitrite: x   Leuk Esterase: x / RBC: x / WBC x   Sq Epi: x / Non Sq Epi: x / Bacteria: x    CAPILLARY BLOOD GLUCOSE    POCT Blood Glucose.: 105 mg/dL (30 Oct 2024 11:29)  POCT Blood Glucose.: 101 mg/dL (30 Oct 2024 07:48)    Radiology: Reviewed.   < from: CT Lumbar Spine No Cont (10.29.24 @ 22:51) >    ACC: [id number] EXAM:  CT LUMBAR SPINE   ORDERED BY: BEATRICE XIOMY     PROCEDURE DATE:  10/29/2024      INTERPRETATION:  CT LUMBAR SPINE:    CLINICAL INDICATION: Radiculopathic pain    TECHNIQUE: CT of the lumbar spine was performed without the  administration of intravenous contrast, according to standard protocol.   3-D reconstructions were obtained.    COMPARISON: None    FINDINGS:    ALIGNMENT: Straightening of the spine without listhesis.    VERTEBRAE: The vertebral bodies are normal in height. There is no   fracture identified. There is a 1.8 cm sclerotic lesion involving the   left iliac bone, abutting the left sacroiliac joint.    DISCS: The disc spaces are maintained.    EVALUATION OF INDIVIDUAL LEVELS DEMONSTRATES: See below:    T12-L1: No significant spinal canal stenosis or neuroforaminal narrowing.    L1-L2: Shallow disc bulge present, impinging upon the ventral thecal sac,   without resulting in significant spinal canal stenosis or neuroforaminal   narrowing.    L2-L3: Broad-based disc bulge present, without significant spinal canal   stenosis. There is moderate right and mild left neuroforaminal narrowing.    L3-L4: Broad-based disc bulge present, resulting in mild spinal canal   stenosis and moderate right and mild left neuroforaminal narrowing.    L4-L5: Shallow disc bulge present, resulting in mild spinal canal   stenosis and mild bilateral neuroforaminal narrowing.    L5-S1: Central calcified disc protrusion identified, without significant   spinal canal stenosis or neuroforaminal narrowing.    PARAVERTEBRAL SOFT TISSUES: The visualized paravertebral soft tissues   appear within normal limits.      IMPRESSION:  No acute traumatic injury to the lumbar spine.    Multilevel degenerative changes, described in detail above, with moderate   right-sided neuroforaminal narrowing suggested at and L2-L3 and L3-L4.   MRI lumbar spine could be considered if symptoms persist.    --- End of Report ---     ANITA HERNANDEZ MD; Attending Radiologist  Thisdocument has been electronically signed. Oct 30 2024 12:26AM    < end of copied text >    ORT Score -   Family Hx of substance abuse	Female	      Male  Alcohol 	                                           1                     3  Illegal drugs	                                   2                     3  Rx drugs                                           4 	                  4  Personal Hx of substance abuse		  Alcohol 	                                          3	                  3  Illegal drugs                                     4	                  4  Rx drugs                                            5 	                  5  Age between 16- 45 years	           1                     1  hx preadolescent sexual abuse	   3 	                  0  Psychological disease		  ADD, OCD, bipolar, schizophrenia   2	          2  Depression                                           1 	          1  Total: 0    a score of 3 or lower indicates low risk for opioid abuse		  a score of 4-7 indicates moderate risk for opioid abuse		  a score of 8 or higher indicates high risk for opioid abuse  	  REVIEW OF SYSTEMS:  CONSTITUTIONAL: No fever or fatigue  HEENT:  No difficulty hearing, no change in vision  NECK: No pain or stiffness  RESPIRATORY: No cough, wheezing, chills or hemoptysis; No shortness of breath  CARDIOVASCULAR: No chest pain, palpitations, dizziness, or leg swelling  GASTROINTESTINAL: No loss of appetite, decreased PO intake. No abdominal or epigastric pain. No nausea, vomiting; No diarrhea or constipation.   GENITOURINARY: No dysuria, frequency, hematuria, retention. Hx of stress urinary incontience  MUSCULOSKELETAL: + low back and right sided hip pain; + BLE pain, no upper motor strength weakness, + lower extremity motor weakness due to pain, no saddle anesthesia, bowel/bladder incontinence, no falls   NEURO: No headaches, + BLE numbness  ENDOCRINE: No polyuria, polydipsia, heat or cold intolerance; No hair loss  PSYCHIATRIC: No depression, anxiety or difficulty sleeping    PHYSICAL EXAM:  GENERAL:  Alert & Oriented X4, cooperative, NAD,  [place] speaking  RESPIRATORY: Respirations even and unlabored. Clear to auscultation bilaterally  CARDIOVASCULAR: Normal S1/S2, regular rate and rhythm  GASTROINTESTINAL:  Soft, Nontender, Nondistended; Bowel sounds present  PERIPHERAL VASCULAR:  Extremities warm. 2+ Peripheral Pulses, No cyanosis, No calf tenderness  MUSCULOSKELETAL: Motor Strength 5/5 B/L upper/lower extremities; moves all extremities equally against gravity; decreased RLE ROM due to pain; + R sided SLR at 30 degrees; + lumbar paraspinal tenderness  SKIN: Warm, dry, intact.     Risk factors associated with adverse outcomes related to opioid treatment  [ ] Concurrent benzodiazepine use  [ ] History/ Active substance use or alcohol use disorder  [ ] Psychiatric co-morbidity  [ ] Sleep apnea  [ ] COPD  [ ] BMI> 35  [ ] Liver dysfunction  [ ] Renal dysfunction  [ ] CHF  [ ] Smoker  [ ] Age > 60 years    [x]  [place]  Reviewed and Copied to Chart. See below.    Plan of care and goal oriented pain management treatment options were discussed with patient and /or primary care giver; all questions and concerns were addressed and care was aligned with patient's wishes.    Educated patient on goal oriented pain management treatment options

## 2024-10-30 NOTE — ED ADULT NURSE NOTE - ALCOHOL PRE SCREEN (AUDIT - C)
Bariatric Psychological Evaluation    Name: Kristopher Sumner     YOB: 1973     [id number]     Date of Evaluation:10/21/2021'    Referral:  [facility], Dr. Young Anthony, Dr. Lew Montano      Professional Time Spent    Professional Psychological/ Neuropsychological Testing Evaluation Services (Base: 45740, Add-on: 15619) Date Total Minutes Total Units   Intra-session Clinical Decision Making  10/21/21 60 04317:  1 (60 minutes)    21808: 1 (60 minutes)      Referral review/test selection:         Patient limitation management:         Patient behavior management:      Record Review/Data Interpretation/Integration/Report Generation: 10/21/21 60    Record Review/Integration/Report Generation:      Interactive Feedback:            Professional Psychological/ Neuropsychological Testing Administration and Scoring (Base: 60639, Add-on: 13932) Date Total Minutes Total Units   Total Test Administration by Psychologist:  10/21/21 30 22991: 1 unit (30 minutes)    65370: 1 unit (30 minutes)   Scoring Time by Psychologist: 10/21/21 30        Referral Reason and Background: Client seen for bariatric presurgical evaluation.      Medical and Psychological History: see consult report    Mental Status Evaluation: see consult report    Test Results: see test results    Diagnosis: F50.9 Eating Disorder NOS      Summary and Recommendations:consult report      Eren Lancaster Psy.D.  Licensed Clinical Psychologist       Statement Selected

## 2024-10-30 NOTE — H&P ADULT - ATTENDING COMMENTS
IMAGING  CT Lumbar Spine  IMPRESSION:  No acute traumatic injury to the lumbar spine.  Multilevel degenerative changes, described in detail above, with moderate right-sided neuroforaminal narrowing suggested at and L2-L3 and L3-L4. MRI lumbar spine could be considered if symptoms persist.    HPI  59 year old female patient with pmhx asthma, DM, HLD, Migraine, GERD, Urinary Incontinence who presented to the ER due to low back pain with bilateral radiculopathy with the right leg worse then the left leg for the past 1 month.  The pain is worse with bending forward. Straight leg test positive bilaterally but symptoms worse on right. She has some numbness/tingling in her thigh area with the radiculopathy. 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 No injuries to her back that she can recall. CT Lumbar spine with moderate right-sided neuroforaminal narrowing suggested at and L2-L3 and L3-L4.    Review Of Systems included: + low back pain, + lower extremity radiculopathy worse on right, + numbness, + lower abdominal pain, + neck and shoulder pain, + nausea, + inability to ambulate due to pain,   No injuries to back, no chest pain, no shortness of breath, no dysuria, no urinary frequency, no vomiting, no recent lifting of heavy objects    Physical Exam  General: Awake, Alert, Oriented  Cardiac: RRR  Pulmonary: CTA b/l  Abdominal: Soft, ND, NT  Back: Tenderness to palpation of mid-line of low back. Straight leg test positive bilaterally  Extremities: No edema b/l    A/P  # Severe Intractable Back Pain with Bilateral Radiculopathy  # Ambulatory Dysfunction  > Moderate right-sided neuroforaminal narrowing suggested at and L2-L3 and L3-L4 on CT Lumbar Spine  - Consult Pain Medicine  - PT Evaluation  - Fall Precautions  - Tylenol prn for mild pain, Oxycodone 2.5mg prn for moderate pain, IV Morphine 4mg prn for severe pain    # Hx of DM  - Insulin Sliding Scale  - Blood Glucose Monitoring  - Can monitor blood glucose for 24 hours before starting long acting insulin if needed    # Hx of Asthma, HLD, Migraine, GERD, Urinary Incontinence  - Continue home medications PPI, Oxybutynin, Statin    # DVT PPx  - Lovenox    # FEN  - Diabetic DASH Diet  - Monitor and replete electrolytes as needed    Previous Admissions Included  10/1/2024: [facility] Note: XR Right Knee - negative. Doppler of the Lower Extremities - negative for DVT. Results discussed with the patient. Still persists with pain and cramps in the lower extremities. Requisition for Vascular Study and vascular evaluation placed  9/19/2024: [facility] Visit Note: States that she has been with right lower extremity pain since 9/14/2024. States that she has been taking Naproxen and Acetaminophen without relief. States that she has been with swelling. States that she has not been with any shortness of breath. States that she has not had any fall or trauma. States that the pain extends from the right hip to the right foot. States that mainly the knee is what is paining her.  11/3/2022: [facility] Visit: urinary stress incontinence, atrophic vaginitis, female cystocele    Patient case and management was discussed with ER Attending  I did examine all labs (including CBC, CMP), imaging, prior notes IMAGING  CT Lumbar Spine  IMPRESSION:  No acute traumatic injury to the lumbar spine.  Multilevel degenerative changes, described in detail above, with moderate right-sided neuroforaminal narrowing suggested at and L2-L3 and L3-L4. MRI lumbar spine could be considered if symptoms persist.    HPI  59 year old female patient with pmhx asthma, DM, HLD, Migraine, GERD, Urinary Incontinence who presented to the ER due to low back pain with bilateral radiculopathy with the right leg worse then the left leg for the past 1 month.  The pain is worse with bending forward. Straight leg test positive bilaterally but symptoms worse on right. She has some numbness/tingling in her thigh area with the radiculopathy. 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 No injuries to her back that she can recall. CT Lumbar spine with moderate right-sided neuroforaminal narrowing suggested at and L2-L3 and L3-L4.    Review Of Systems included: + low back pain, + lower extremity radiculopathy worse on right, + numbness, + lower abdominal pain, + neck and shoulder pain, + nausea, + inability to ambulate due to pain,   No injuries to back, no chest pain, no shortness of breath, no dysuria, no urinary frequency, no vomiting, no recent lifting of heavy objects    Physical Exam  General: Awake, Alert, Oriented  Cardiac: RRR  Pulmonary: CTA b/l  Abdominal: Soft, ND, NT  Back: Tenderness to palpation of mid-line of low back. Straight leg test positive bilaterally  Extremities: No edema b/l    A/P  # Severe Intractable Back Pain with Bilateral Radiculopathy  # Ambulatory Dysfunction  > Moderate right-sided neuroforaminal narrowing suggested at and L2-L3 and L3-L4 on CT Lumbar Spine  - Consult Pain Medicine  - PT Evaluation  - Fall Precautions  - Tylenol prn for mild pain, Oxycodone 2.5mg prn for moderate pain, IV Morphine 4mg prn for severe pain  - Lidocaine Patch    # Hx of DM  - Insulin Sliding Scale  - Blood Glucose Monitoring  - Can monitor blood glucose for 24 hours before starting long acting insulin if needed    # Hx of Asthma, HLD, Migraine, GERD, Urinary Incontinence  - Continue home medications PPI, Oxybutynin, Statin    # DVT PPx  - Lovenox    # FEN  - Diabetic DASH Diet  - Monitor and replete electrolytes as needed    Previous Admissions Included  10/1/2024: [facility] Note: XR Right Knee - negative. Doppler of the Lower Extremities - negative for DVT. Results discussed with the patient. Still persists with pain and cramps in the lower extremities. Requisition for Vascular Study and vascular evaluation placed  9/19/2024: [facility] Visit Note: States that she has been with right lower extremity pain since 9/14/2024. States that she has been taking Naproxen and Acetaminophen without relief. States that she has been with swelling. States that she has not been with any shortness of breath. States that she has not had any fall or trauma. States that the pain extends from the right hip to the right foot. States that mainly the knee is what is paining her.  11/3/2022: [facility] Visit: urinary stress incontinence, atrophic vaginitis, female cystocele    Patient case and management was discussed with ER Attending. Patient in severe pain. Likely lumbar radiculopathy with no signs of cauda equina. Received 1000mg Acetaminophen, Lidocaine patch, 15mg Ketorolac, 2mg and 4mg IV Morphine. Patient still in severe pain. Will continue IV Morphine 4mg prn for severe pain as patient still in severe pain. Will consult pain medicine.  I did examine all labs (including CBC, CMP), imaging, prior notes IMAGING  CT Lumbar Spine  IMPRESSION:  No acute traumatic injury to the lumbar spine.  Multilevel degenerative changes, described in detail above, with moderate right-sided neuroforaminal narrowing suggested at and L2-L3 and L3-L4. MRI lumbar spine could be considered if symptoms persist.    HPI  59 year old female patient with pmhx asthma, DM, HLD, Migraine, GERD, Urinary Incontinence who presented to the ER due to low back pain with bilateral radiculopathy with the right leg worse then the left leg for the past 1 month.  The pain is worse with bending forward. Straight leg test positive bilaterally but symptoms worse on right. She has some numbness/tingling in her thigh area with the radiculopathy. 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 No injuries to her back that she can recall. CT Lumbar spine with moderate right-sided neuroforaminal narrowing suggested at and L2-L3 and L3-L4.    Review Of Systems included: + low back pain, + lower extremity radiculopathy worse on right, + numbness, + lower abdominal pain, + neck and shoulder pain, + nausea, + inability to ambulate due to pain,   No injuries to back, no chest pain, no shortness of breath, no dysuria, no urinary frequency, no vomiting, no recent lifting of heavy objects    Physical Exam  General: Awake, Alert, Oriented  Cardiac: RRR  Pulmonary: CTA b/l  Abdominal: Soft, ND, NT  Back: Tenderness to palpation of mid-line of low back. Straight leg test positive bilaterally  Extremities: No edema b/l    A/P  # Severe Intractable Back Pain with Bilateral Radiculopathy  # Ambulatory Dysfunction  > Moderate right-sided neuroforaminal narrowing suggested at and L2-L3 and L3-L4 on CT Lumbar Spine  - Consult Pain Medicine  - PT Evaluation  - Fall Precautions  - Tylenol prn for mild pain, Oxycodone 2.5mg prn for moderate pain, IV Morphine 4mg prn for severe pain  - Lidocaine Patch    # Hx of DM  - Insulin Sliding Scale  - Blood Glucose Monitoring  - Can monitor blood glucose for 24 hours before starting long acting insulin if needed    # Hx of Asthma, HLD, Migraine, GERD, Urinary Incontinence  - Continue home medications PPI, Oxybutynin, Statin    # DVT PPx  - Lovenox    # FEN  - Diabetic DASH Diet  - Monitor and replete electrolytes as needed    Previous Admissions Included  10/1/2024: [facility] Note: XR Right Knee - negative. Doppler of the Lower Extremities - negative for DVT. Results discussed with the patient. Still persists with pain and cramps in the lower extremities. Requisition for Vascular Study and vascular evaluation placed  9/19/2024: [facility] Visit Note: States that she has been with right lower extremity pain since 9/14/2024. States that she has been taking Naproxen and Acetaminophen without relief. States that she has been with swelling. States that she has not been with any shortness of breath. States that she has not had any fall or trauma. States that the pain extends from the right hip to the right foot. States that mainly the knee is what is paining her.  11/3/2022: [facility] Visit: urinary stress incontinence, atrophic vaginitis, female cystocele    Patient case and management was discussed with ER Attending. Patient in severe pain. Likely lumbar radiculopathy with no signs of cauda equina. Received 1000mg Acetaminophen, Lidocaine patch, 1,500mg Methocarbamol, 15mg Ketorolac, 2mg and 4mg IV Morphine. Patient still in severe pain. Will continue IV Morphine 4mg prn for severe pain as patient still in severe pain. Will consult pain medicine.  I did examine all labs (including CBC, CMP), imaging, prior notes IMAGING  CT Lumbar Spine  IMPRESSION:  No acute traumatic injury to the lumbar spine.  Multilevel degenerative changes, described in detail above, with moderate right-sided neuroforaminal narrowing suggested at and L2-L3 and L3-L4. MRI lumbar spine could be considered if symptoms persist.    HPI  59 year old female patient with pmhx asthma, DM, HLD, Migraine, GERD, Urinary Incontinence who presented to the ER due to low back pain with bilateral radiculopathy with the right leg worse then the left leg for the past 1 month.  The pain is worse with bending forward. Straight leg test positive bilaterally but symptoms worse on right. She has some numbness/tingling in her thigh area with the radiculopathy. 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 No injuries to her back that she can recall. CT Lumbar spine with moderate right-sided neuroforaminal narrowing suggested at and L2-L3 and L3-L4.    Review Of Systems included: + low back pain, + lower extremity radiculopathy worse on right, + numbness, + lower abdominal pain, + neck and shoulder pain, + nausea, + inability to ambulate due to pain,   No injuries to back, no chest pain, no shortness of breath, no dysuria, no urinary frequency, no vomiting, no recent lifting of heavy objects    Physical Exam  General: Awake, Alert, Oriented  Cardiac: RRR  Pulmonary: CTA b/l  Abdominal: Soft, ND, NT  Back: Tenderness to palpation of mid-line of low back. Straight leg test positive bilaterally  Extremities: No edema b/l    A/P  # Severe Intractable Back Pain with Bilateral Radiculopathy  # Ambulatory Dysfunction  > Moderate right-sided neuroforaminal narrowing suggested at and L2-L3 and L3-L4 on CT Lumbar Spine  - Consult Pain Medicine  - PT Evaluation  - Fall Precautions  - Tylenol prn for mild pain, Oxycodone 2.5mg prn for moderate pain, IV Morphine 4mg prn for severe pain  - Lidocaine Patch    # Hx of DM  - Insulin Sliding Scale  - Blood Glucose Monitoring  - Can monitor blood glucose for 24 hours before starting long acting insulin if needed    # Hx of Asthma, HLD, Migraine, GERD, Urinary Incontinence  - Continue home medications PPI, Oxybutynin, Statin    # DVT PPx  - Lovenox    # FEN  - Diabetic DASH Diet  - Monitor and replete electrolytes as needed    Previous Admissions Included  10/1/2024: [facility] Note: XR Right Knee - negative. Doppler of the Lower Extremities - negative for DVT. Results discussed with the patient. Still persists with pain and cramps in the lower extremities. Requisition for Vascular Study and vascular evaluation placed  9/19/2024: [facility] Visit Note: States that she has been with right lower extremity pain since 9/14/2024. States that she has been taking Naproxen and Acetaminophen without relief. States that she has been with swelling. States that she has not been with any shortness of breath. States that she has not had any fall or trauma. States that the pain extends from the right hip to the right foot. States that mainly the knee is what is paining her.  11/3/2022: [facility] Visit: urinary stress incontinence, atrophic vaginitis, female cystocele    Patient case and management was discussed with ER Attending. Patient in severe pain. Likely lumbar radiculopathy with no signs of cauda equina. Received 1000mg Acetaminophen, Lidocaine patch, 1,500mg Methocarbamol, 15mg Ketorolac, 2mg and 4mg IV Morphine. Patient still in severe pain. Will continue IV Morphine 4mg prn for severe pain as patient still in severe pain requiring opioids. Will consult pain medicine.  I did examine all labs (including CBC, CMP), imaging, prior notes IMAGING  CT Lumbar Spine  IMPRESSION:  No acute traumatic injury to the lumbar spine.  Multilevel degenerative changes, described in detail above, with moderate right-sided neuroforaminal narrowing suggested at and L2-L3 and L3-L4. MRI lumbar spine could be considered if symptoms persist.    HPI  59 year old female patient with pmhx asthma, DM, HLD, Migraine, GERD, Urinary Incontinence who presented to the ER due to low back pain with bilateral radiculopathy with the right leg worse then the left leg for the past 1 month.  The pain is worse with bending forward. Straight leg test positive bilaterally but symptoms worse on right. She has some numbness/tingling in her thigh area with the radiculopathy. 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 No injuries to her back that she can recall. CT Lumbar spine with moderate right-sided neuroforaminal narrowing suggested at and L2-L3 and L3-L4.    Review Of Systems included: + low back pain, + lower extremity radiculopathy worse on right, + numbness, + lower abdominal pain, + neck and shoulder pain, + nausea, + inability to ambulate due to pain,   No injuries to back, no chest pain, no shortness of breath, no dysuria, no urinary frequency, no vomiting, no recent lifting of heavy objects, no saddle anesthesia, no new bowel/bladder dysfunction    Physical Exam  General: Awake, Alert, Oriented  Cardiac: RRR  Pulmonary: CTA b/l  Abdominal: Soft, ND, NT  Back: Tenderness to palpation of mid-line of low back. Straight leg test positive bilaterally  Extremities: No edema b/l    A/P  # Severe Intractable Low Back Pain with Bilateral Radiculopathy  # Ambulatory Dysfunction  > Moderate right-sided neuroforaminal narrowing suggested at and L2-L3 and L3-L4 on CT Lumbar Spine  > No saddle anesthesia, no new bowel/bladder dysfunction  - Consult Pain Medicine  - PT Evaluation  - Fall Precautions  - Tylenol prn for mild pain, Oxycodone 2.5mg prn for moderate pain, IV Morphine 4mg prn for severe pain  - Lidocaine Patch    # Hx of DM  - Insulin Sliding Scale  - Blood Glucose Monitoring  - Can monitor blood glucose for 24 hours before starting long acting insulin if needed    # Hx of Asthma, HLD, Migraine, GERD, Urinary Incontinence  - Continue home medications PPI, Oxybutynin, Statin    # DVT PPx  - Lovenox    # FEN  - Diabetic DASH Diet  - Monitor and replete electrolytes as needed    Previous Admissions Included  10/1/2024: [facility] Note: XR Right Knee - negative. Doppler of the Lower Extremities - negative for DVT. Results discussed with the patient. Still persists with pain and cramps in the lower extremities. Requisition for Vascular Study and vascular evaluation placed  9/19/2024: [facility] Visit Note: States that she has been with right lower extremity pain since 9/14/2024. States that she has been taking Naproxen and Acetaminophen without relief. States that she has been with swelling. States that she has not been with any shortness of breath. States that she has not had any fall or trauma. States that the pain extends from the right hip to the right foot. States that mainly the knee is what is paining her.  11/3/2022: [facility] Visit: urinary stress incontinence, atrophic vaginitis, female cystocele    Patient case and management was discussed with ER Attending. Patient in severe pain. Likely lumbar radiculopathy with no signs of cauda equina. Received 1000mg Acetaminophen, Lidocaine patch, 1,500mg Methocarbamol, 15mg Ketorolac, 2mg and 4mg IV Morphine. Patient still in severe pain. Will continue IV Morphine 4mg prn for severe pain as patient still in severe pain requiring opioids. Will consult pain medicine.  I did examine all labs (including CBC, CMP), imaging, prior notes

## 2024-10-30 NOTE — CHART NOTE - NSCHARTNOTEFT_GEN_A_CORE
60 yo F with PMH of Asthma, GERD, DM, HLD, Urinary incontinence, present with back pain radiating to bilateral legs for 1 month, worsening over the last few days.     # Intractable back pain 2/2 Lumbar radiculopathy  # Ambulatory dysfunction  # Asthma  # GERD  # DM  # HLD  # Urinary incontinence    - CT shows L spine narrowing, obtain MRI for further evaluation. Lacks signs of cauda equina syndrome  - consult pain medicine for pain control  - PT evaluation  - FS/SSI  - continue home PPI, H2B, oxybutynin, statin  - DVT ppx: Lovenox

## 2024-10-31 ENCOUNTER — TRANSCRIPTION ENCOUNTER (OUTPATIENT)
Age: 59
End: 2024-10-31

## 2024-10-31 VITALS
HEART RATE: 71 BPM | RESPIRATION RATE: 17 BRPM | DIASTOLIC BLOOD PRESSURE: 80 MMHG | SYSTOLIC BLOOD PRESSURE: 118 MMHG | OXYGEN SATURATION: 96 % | TEMPERATURE: 98 F

## 2024-10-31 LAB
ANION GAP SERPL CALC-SCNC: 4 MMOL/L — LOW (ref 5–17)
BUN SERPL-MCNC: 20 MG/DL — HIGH (ref 7–18)
CALCIUM SERPL-MCNC: 9.7 MG/DL — SIGNIFICANT CHANGE UP (ref 8.4–10.5)
CHLORIDE SERPL-SCNC: 109 MMOL/L — HIGH (ref 96–108)
CHOLEST SERPL-MCNC: 147 MG/DL — SIGNIFICANT CHANGE UP
CO2 SERPL-SCNC: 28 MMOL/L — SIGNIFICANT CHANGE UP (ref 22–31)
CREAT SERPL-MCNC: 0.66 MG/DL — SIGNIFICANT CHANGE UP (ref 0.5–1.3)
EGFR: 101 ML/MIN/1.73M2 — SIGNIFICANT CHANGE UP
GLUCOSE BLDC GLUCOMTR-MCNC: 102 MG/DL — HIGH (ref 70–99)
GLUCOSE BLDC GLUCOMTR-MCNC: 92 MG/DL — SIGNIFICANT CHANGE UP (ref 70–99)
GLUCOSE SERPL-MCNC: 107 MG/DL — HIGH (ref 70–99)
HCT VFR BLD CALC: 39.8 % — SIGNIFICANT CHANGE UP (ref 34.5–45)
HDLC SERPL-MCNC: 47 MG/DL — LOW
HGB BLD-MCNC: 13.5 G/DL — SIGNIFICANT CHANGE UP (ref 11.5–15.5)
LIPID PNL WITH DIRECT LDL SERPL: 85 MG/DL — SIGNIFICANT CHANGE UP
MCHC RBC-ENTMCNC: 29.7 PG — SIGNIFICANT CHANGE UP (ref 27–34)
MCHC RBC-ENTMCNC: 33.9 G/DL — SIGNIFICANT CHANGE UP (ref 32–36)
MCV RBC AUTO: 87.5 FL — SIGNIFICANT CHANGE UP (ref 80–100)
NON HDL CHOLESTEROL: 100 MG/DL — SIGNIFICANT CHANGE UP
NRBC # BLD: 0 /100 WBCS — SIGNIFICANT CHANGE UP (ref 0–0)
PLATELET # BLD AUTO: 222 K/UL — SIGNIFICANT CHANGE UP (ref 150–400)
POTASSIUM SERPL-MCNC: 4 MMOL/L — SIGNIFICANT CHANGE UP (ref 3.5–5.3)
POTASSIUM SERPL-SCNC: 4 MMOL/L — SIGNIFICANT CHANGE UP (ref 3.5–5.3)
RBC # BLD: 4.55 M/UL — SIGNIFICANT CHANGE UP (ref 3.8–5.2)
RBC # FLD: 12.2 % — SIGNIFICANT CHANGE UP (ref 10.3–14.5)
SODIUM SERPL-SCNC: 141 MMOL/L — SIGNIFICANT CHANGE UP (ref 135–145)
TRIGL SERPL-MCNC: 77 MG/DL — SIGNIFICANT CHANGE UP
WBC # BLD: 4.49 K/UL — SIGNIFICANT CHANGE UP (ref 3.8–10.5)
WBC # FLD AUTO: 4.49 K/UL — SIGNIFICANT CHANGE UP (ref 3.8–10.5)

## 2024-10-31 PROCEDURE — 99285 EMERGENCY DEPT VISIT HI MDM: CPT

## 2024-10-31 PROCEDURE — 83735 ASSAY OF MAGNESIUM: CPT

## 2024-10-31 PROCEDURE — 85025 COMPLETE CBC W/AUTO DIFF WBC: CPT

## 2024-10-31 PROCEDURE — 80061 LIPID PANEL: CPT

## 2024-10-31 PROCEDURE — 97162 PT EVAL MOD COMPLEX 30 MIN: CPT

## 2024-10-31 PROCEDURE — 83036 HEMOGLOBIN GLYCOSYLATED A1C: CPT

## 2024-10-31 PROCEDURE — 72131 CT LUMBAR SPINE W/O DYE: CPT | Mod: MC

## 2024-10-31 PROCEDURE — 80048 BASIC METABOLIC PNL TOTAL CA: CPT

## 2024-10-31 PROCEDURE — 36415 COLL VENOUS BLD VENIPUNCTURE: CPT

## 2024-10-31 PROCEDURE — 85027 COMPLETE CBC AUTOMATED: CPT

## 2024-10-31 PROCEDURE — 84100 ASSAY OF PHOSPHORUS: CPT

## 2024-10-31 PROCEDURE — 99239 HOSP IP/OBS DSCHRG MGMT >30: CPT

## 2024-10-31 PROCEDURE — 96374 THER/PROPH/DIAG INJ IV PUSH: CPT

## 2024-10-31 PROCEDURE — 99232 SBSQ HOSP IP/OBS MODERATE 35: CPT

## 2024-10-31 PROCEDURE — A9585: CPT

## 2024-10-31 PROCEDURE — 82962 GLUCOSE BLOOD TEST: CPT

## 2024-10-31 PROCEDURE — 72158 MRI LUMBAR SPINE W/O & W/DYE: CPT | Mod: MC

## 2024-10-31 PROCEDURE — 80053 COMPREHEN METABOLIC PANEL: CPT

## 2024-10-31 PROCEDURE — 96375 TX/PRO/DX INJ NEW DRUG ADDON: CPT

## 2024-10-31 RX ORDER — ACETAMINOPHEN 500 MG
2 TABLET ORAL
Qty: 0 | Refills: 0 | DISCHARGE
Start: 2024-10-31

## 2024-10-31 RX ORDER — OXYCODONE HYDROCHLORIDE 30 MG/1
0.5 TABLET ORAL
Qty: 6 | Refills: 0
Start: 2024-10-31 | End: 2024-11-02

## 2024-10-31 RX ORDER — DICLOFENAC SODIUM 75 MG
2 TABLET, DELAYED RELEASE (ENTERIC COATED) ORAL
Refills: 0 | DISCHARGE

## 2024-10-31 RX ORDER — LIDOCAINE HYDROCHLORIDE 40 MG/ML
1 SOLUTION TOPICAL
Qty: 5 | Refills: 0
Start: 2024-10-31 | End: 2024-11-04

## 2024-10-31 RX ORDER — GABAPENTIN 300 MG/1
1 CAPSULE ORAL
Qty: 15 | Refills: 0
Start: 2024-10-31 | End: 2024-11-04

## 2024-10-31 RX ORDER — KETOROLAC TROMETHAMINE 30 MG/ML
30 INJECTION INTRAMUSCULAR; INTRAVENOUS EVERY 8 HOURS
Refills: 0 | Status: DISCONTINUED | OUTPATIENT
Start: 2024-10-31 | End: 2024-10-31

## 2024-10-31 RX ORDER — METHOCARBAMOL 500 MG/1
1 TABLET ORAL
Qty: 15 | Refills: 0
Start: 2024-10-31 | End: 2024-11-04

## 2024-10-31 RX ADMIN — KETOROLAC TROMETHAMINE 15 MILLIGRAM(S): 30 INJECTION INTRAMUSCULAR; INTRAVENOUS at 06:32

## 2024-10-31 RX ADMIN — PANTOPRAZOLE SODIUM 40 MILLIGRAM(S): 40 TABLET, DELAYED RELEASE ORAL at 06:03

## 2024-10-31 RX ADMIN — OXYCODONE HYDROCHLORIDE 2.5 MILLIGRAM(S): 30 TABLET ORAL at 11:47

## 2024-10-31 RX ADMIN — GABAPENTIN 300 MILLIGRAM(S): 300 CAPSULE ORAL at 06:02

## 2024-10-31 RX ADMIN — OXYCODONE HYDROCHLORIDE 2.5 MILLIGRAM(S): 30 TABLET ORAL at 12:17

## 2024-10-31 RX ADMIN — Medication 1000 MILLIGRAM(S): at 06:02

## 2024-10-31 RX ADMIN — METHOCARBAMOL 750 MILLIGRAM(S): 500 TABLET ORAL at 06:02

## 2024-10-31 RX ADMIN — LIDOCAINE HYDROCHLORIDE 2 PATCH: 40 SOLUTION TOPICAL at 11:48

## 2024-10-31 RX ADMIN — FAMOTIDINE 40 MILLIGRAM(S): 10 INJECTION INTRAVENOUS at 11:47

## 2024-10-31 RX ADMIN — Medication 1000 MILLIGRAM(S): at 06:32

## 2024-10-31 RX ADMIN — KETOROLAC TROMETHAMINE 15 MILLIGRAM(S): 30 INJECTION INTRAMUSCULAR; INTRAVENOUS at 06:02

## 2024-10-31 NOTE — DISCHARGE NOTE NURSING/CASE MANAGEMENT/SOCIAL WORK - NSDCPEFALRISK_GEN_ALL_CORE
For information on Fall & Injury Prevention, visit: [email or web address] OR  [email or web address] OR  https://www.cdc.gov/steadi/patient.html

## 2024-10-31 NOTE — DISCHARGE NOTE PROVIDER - NSDCMRMEDTOKEN_GEN_ALL_CORE_FT
acetaminophen 500 mg oral tablet: 2 tab(s) orally every 8 hours  atorvastatin 10 mg oral tablet: 1 tab(s) orally once a day  famotidine 40 mg oral tablet: 1 tab(s) orally once a day  gabapentin 300 mg oral capsule: 1 cap(s) orally 3 times a day  lidocaine 4% topical film: Apply topically to affected area once a day  metFORMIN 500 mg oral tablet: 1 tab(s) orally once a day  methocarbamol 750 mg oral tablet: 1 tab(s) orally every 8 hours  omeprazole 40 mg oral delayed release capsule: 1 cap(s) orally once a day  oxyBUTYnin 5 mg oral tablet: 1 tab(s) orally once a day  oxyCODONE 5 mg oral tablet: 0.5 tab(s) orally 4 times a day MDD: 8

## 2024-10-31 NOTE — DISCHARGE NOTE NURSING/CASE MANAGEMENT/SOCIAL WORK - FINANCIAL ASSISTANCE
[facility] provides services at a reduced cost to those who are determined to be eligible through [facility]’s financial assistance program. Information regarding [facility]’s financial assistance program can be found by going to [email or web address] or by calling [phone].

## 2024-10-31 NOTE — DISCHARGE NOTE NURSING/CASE MANAGEMENT/SOCIAL WORK - PATIENT PORTAL LINK FT
You can access the FollowMyHealth Patient Portal offered by [facility] by registering at the following website: [email or web address]. By joining [facility]’s FollowMyHealth portal, you will also be able to view your health information using other applications (apps) compatible with our system.

## 2024-10-31 NOTE — PROGRESS NOTE ADULT - PROBLEM SELECTOR PLAN 1
Pt with acute low back pain with RLE radiculopathy which is somatic and neuropathic in nature likely due to lumbar disc herniation and sciatica. Pt also reports upper back pain likely somatic due to positioning. MRI 10/30- 1. L5-S1:   Small left central disc protrusion  (disc herniation) 2. Left iliac lesion such as bone island. CTLS demonstrates No acute traumatic injury to the lumbar spine. Multilevel degenerative changes, described in detail above, with moderate right-sided neuroforaminal narrowing suggested at and L2-L3 and L3-L4.  Opioid pain recommendations   - Continue oxycodone to 2.5mg PO q 4hrs PRN for severe pain. Monitor for respiratory depression and sedation  Non-opioid pain recommendations   - Increase Toradol 30mg IVP q 8 hours x 2 days. Monitor renal function  - Continue Acetaminophen 1 gram PO q 8 hours x 3 days. Monitor LFTs  - Continue Robaxin 750mg PO q 8hrs x 5 days. Monitor for sedation   - Continue Gabapentin 300 mg po q 8 hours. Monitor renal function.   - Continue Lidoderm 4% patch daily x 2. (12 hrs on/12 hrs off)  Bowel Regimen  - Continue Miralax 17G PO daily  - Continue Senna 2 tablets at bedtime for constipation  Mild pain (score 1-3)  - Non-pharmacological pain treatment recommendations  - Warm/ Cool packs PRN   - Repositioning extremity, elevation, imagery, relaxation, distraction.  - Physical therapy OOB if no contraindications   Recommendations discussed with primary team and RN. Upon discharge, recommend to follow up with Dr. Hogue at [facility] and [place] at [phone]  Referral sent

## 2024-10-31 NOTE — DISCHARGE NOTE PROVIDER - NSDCCPCAREPLAN_GEN_ALL_CORE_FT
PRINCIPAL DISCHARGE DIAGNOSIS  Diagnosis: Lumbar radiculopathy  Assessment and Plan of Treatment: You presented with 1 month history of atraumatic lower back pain that radiates to your legs.  You have been having difficulty walking    CT lumbar spine showed Multilevel DJD, moderate right-sided neuroforaminal narrowing suggested at and L2-L3 and L3-L  MRI showed  L5-S1:   Small left central disc protrusion  (disc herniation)  and . Left iliac lesion such as bone island.  - pain management followed you here  and placed you on pain madication.  - You were followed by Physical therapy as well and recommeded PT follow up.   You pain is controlled now.   make sure you follow up with your ur PCP in 1 week.        SECONDARY DISCHARGE DIAGNOSES  Diagnosis: GERD (gastroesophageal reflux disease)  Assessment and Plan of Treatment: Continue taking  Omeprazole 40mg, your home dose       Diagnosis: DM (diabetes mellitus)  Assessment and Plan of Treatment: continue taking your metformin, your home dose      Diagnosis: HLD (hyperlipidemia)  Assessment and Plan of Treatment: continue taking your atorvastatin, your home dose

## 2024-10-31 NOTE — DISCHARGE NOTE PROVIDER - HOSPITAL COURSE
60 yo F with PMH of Asthma, GERD, DM, HLD, Urinary incontinence, present with 1 month history of  atraumatic intermittent lower back pain that radiates to  bilateral lower extremities ( R > L),  worsened over  the past few days. Accompanied with difficulty walking in the past few days. CT lumbar spine showed Multilevel DJD, moderate right-sided neuroforaminal narrowing suggested at and L2-L3 and L3-L4. Admitted to medicine for lumbar radiculopathy and ambulatory dysfunction.  MRI  showed . L5-S1:   Small left central disc protrusion  (disc herniation)  and . Left iliac lesion such as bone island.  Pt pain controlled. PT eval home PT. Pt is d/c home with OP f/u with PCP in 1 week.    Please note that this a brief summary of hospital course please refer to daily progress notes and consult notes for full course and events 60 yo F with PMH of Asthma, GERD, DM, HLD, Urinary incontinence, present with 1 month history of atraumatic intermittent lower back pain that radiates to  bilateral lower extremities ( R > L),  worsened over the past few days. Accompanied with difficulty walking in the past few days. CT lumbar spine showed Multilevel DJD, moderate right-sided neuroforaminal narrowing suggested at and L2-L3 and L3-L4. Admitted to medicine for lumbar radiculopathy and ambulatory dysfunction. MRI showed . L5-S1 small left central disc protrusion (disc herniation). Pain medicine consulted, the regimen was adjusted as per recs, and her pain improved tot the point where she could ambulate. PT evaluated home PT. Pt is d/c home with OP f/u with PCP in 1 week.    Please note that this a brief summary of hospital course please refer to daily progress notes and consult notes for full course and events

## 2024-10-31 NOTE — DISCHARGE NOTE PROVIDER - NSDCFUADDAPPT_GEN_ALL_CORE_FT
APPTS ARE READY TO BE MADE: [ ] YES    Best Family or Patient Contact (if needed):    Additional Information about above appointments (if needed):    1: PCP    Other comments or requests:

## 2024-10-31 NOTE — PROGRESS NOTE ADULT - ASSESSMENT
Search Terms: Edel Da Silva, 1965Search Date: 10/30/2024 12:19:48 PM  The Drug Utilization Report below displays all of the controlled substance prescriptions, if any, that your patient has filled in the last twelve months. The information displayed on this report is compiled from pharmacy submissions to the Department, and accurately reflects the information as submitted by the pharmacies.    This report was requested by: Regina Ross | Reference #: [id number]    There are no results for the search terms that you entered.

## 2024-10-31 NOTE — PROGRESS NOTE ADULT - SUBJECTIVE AND OBJECTIVE BOX
Source of information: PATRICIO MCGREGOR, Chart review  Patient language: Turkish  : Betsy [id number]    HPI:  60 yo F with PMH of Asthma, GERD, DM, HLD, Urinary incontinence, present with 1 month history of  atraumatic intermittent lower back pain that radiates to  bilateral lower extremities ( R > L),  worsened over  the past few days. Accompanied with difficulty walking in the past few days. As per patient, she woke up with the pain and she describe  the pain as a  tingling sensation that radiates to her leg, Patient reports the pain worsens with bending over or lifting objects. 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 Patient reports seeing her PCP 1 month ago regarding the pain. Patient  was prescribed diclofenac with little improvement. Patient reports that she recently did an xray of bilateral knees which was negative for fracture. In addition, patient did a venous doppler of the right lower extremities which was negative for DVT.  Patient denie  fevers, headache, vision change, chest pain, shortness of breath,  vomiting, diarrhea, dysuria, numbness, focal weakness,  bowel/bladder dysfunction, Patient reports today she started having some pain in the hip    In the ED,   v/s: 151/91, , Temp 98.2, 100% RA   Repeat: 101/69, HR 65, 975 RA  Labs: Unremarkable   EK BPM, NSR  CT lumbar spine: Multilevel DJD, moderate right-sided neuroforaminal narrowing suggested at and L2-L3 and L3-L4.   s/p Tyleno, Toraldol, Lidocaine, Robaxin 1500mg, Morphine 4mg   (30 Oct 2024 04:53)    Pt is admitted for intractable back pain with ambulatory dysfunction.     CTLS demonstrates No acute traumatic injury to the lumbar spine. Multilevel degenerative changes, described in detail above, with moderate right-sided neuroforaminal narrowing suggested at and L2-L3 and L3-L4.   MRI 10/30- 1. L5-S1:   Small left central disc protrusion  (disc herniation) 2. Left iliac lesion such as bone island.     Pain service consulted 10/30 for acute low back pain. Per patient, she began having bilateral lower extremity pain RLE > LLE and swelling one month ago, denies any trauma or known precipitating factors.  Pt seen and examined at bedside, this morning. Pt sitting up in bed in NAD reports upper back pain started this morning, rating pain score 8/10 SCALE USED: (1-10 VNRS). Reports lumbar back pain has improved, rating lumbar back pain 4/10. Describes pain as aching, non-radiating, alleviated by pain medication and is exacerbated by movement. Continues to report right leg numbness. Pt tolerating PO diet. Denies lethargy, chest pain, SOB, nausea, vomiting, constipation.  history of stress urinary incontinence and constipation. Reports last BM 10/29.  Patient stated goal for pain control: to be able to take deep breaths, get out of bed to chair and ambulate with tolerable pain control. Pt reports taking Tylenol and Aleve at home for pain.  Requesting to be discharged home today.     PAST MEDICAL & SURGICAL HISTORY:  GERD (gastroesophageal reflux disease)    H/O urinary incontinence    Diabetes mellitus    HLD (hyperlipidemia)    History of     FAMILY HISTORY:  No pertinent family history in first degree relatives      Social History:  [x] Denies ETOH use, illicit drug use and smoking    Allergies    No Known Allergies    MEDICATIONS  (STANDING):  acetaminophen     Tablet .. 1000 milliGRAM(s) Oral every 8 hours  atorvastatin 10 milliGRAM(s) Oral at bedtime  enoxaparin Injectable 40 milliGRAM(s) SubCutaneous every 24 hours  famotidine    Tablet 40 milliGRAM(s) Oral daily  gabapentin 300 milliGRAM(s) Oral three times a day  insulin lispro (ADMELOG) corrective regimen sliding scale   SubCutaneous three times a day before meals  insulin lispro (ADMELOG) corrective regimen sliding scale   SubCutaneous at bedtime  ketorolac   Injectable 15 milliGRAM(s) IV Push every 8 hours  lidocaine   4% Patch 2 Patch Transdermal daily  methocarbamol 750 milliGRAM(s) Oral every 8 hours  pantoprazole    Tablet 40 milliGRAM(s) Oral before breakfast    MEDICATIONS  (PRN):  melatonin 3 milliGRAM(s) Oral at bedtime PRN Insomnia  ondansetron Injectable 4 milliGRAM(s) IV Push every 8 hours PRN Nausea and/or Vomiting  oxyCODONE    IR 2.5 milliGRAM(s) Oral every 4 hours PRN Severe Pain (7 - 10)      Vital Signs Last 24 Hrs  T(C): 36.3 (31 Oct 2024 05:10), Max: 37.2 (30 Oct 2024 12:30)  T(F): 97.4 (31 Oct 2024 05:10), Max: 98.9 (30 Oct 2024 12:30)  HR: 77 (31 Oct 2024 09:41) (62 - 77)  BP: 132/80 (31 Oct 2024 09:41) (94/60 - 151/89)  BP(mean): --  RR: 17 (31 Oct 2024 05:10) (17 - 18)  SpO2: 97% (31 Oct 2024 05:10) (96% - 97%)    Parameters below as of 31 Oct 2024 05:10  Patient On (Oxygen Delivery Method): room air        LABS: Reviewed                          13.5   4.49  )-----------( 222      ( 31 Oct 2024 06:13 )             39.8     10-31    141  |  109[H]  |  20[H]  ----------------------------<  107[H]  4.0   |  28  |  0.66    Ca    9.7      31 Oct 2024 06:13  Phos  4.5     10-30  Mg     2.2     10-30    TPro  6.9  /  Alb  3.7  /  TBili  0.5  /  DBili  x   /  AST  18  /  ALT  25  /  AlkPhos  58  10-30      LIVER FUNCTIONS - ( 30 Oct 2024 05:00 )  Alb: 3.7 g/dL / Pro: 6.9 g/dL / ALK PHOS: 58 U/L / ALT: 25 U/L DA / AST: 18 U/L / GGT: x           Urinalysis Basic - ( 31 Oct 2024 06:13 )    Color: x / Appearance: x / SG: x / pH: x  Gluc: 107 mg/dL / Ketone: x  / Bili: x / Urobili: x   Blood: x / Protein: x / Nitrite: x   Leuk Esterase: x / RBC: x / WBC x   Sq Epi: x / Non Sq Epi: x / Bacteria: x      CAPILLARY BLOOD GLUCOSE      POCT Blood Glucose.: 102 mg/dL (31 Oct 2024 07:57)  POCT Blood Glucose.: 120 mg/dL (30 Oct 2024 21:17)  POCT Blood Glucose.: 104 mg/dL (30 Oct 2024 16:32)  POCT Blood Glucose.: 105 mg/dL (30 Oct 2024 11:29)    Radiology: Reviewed.   < from: MR Lumbar Spine w/wo IV Cont (10.30.24 @ 13:59) >    ACC: [id number] EXAM:  MR SPINE LUMBAR WAW IC   ORDERED BY: DENISE GOTTI     PROCEDURE DATE:  10/30/2024          INTERPRETATION:  MR lumbar spine with and without gadolinium contrast    CLINICAL INFORMATION:    Sclerotic lesion noted on CT  HMR  Admitting   Dxs: M54.9 DORSALGIA, UNSPECIFIED  severe low back pain with right > left radiculopathy  Radiculopathic pain  ADDITIONAL CLINICAL INFORMATION:   Unspecified injury to lower back   S39.92XS    TECHNIQUE:   Sagittal and axial T2-weighted images, sagittal and coronal   and axial T1-weighted and sagittal STIR images of the lumbar spine were   obtained. Following gadolinium administration fat-saturated sagittal and   axial T1-weighted images were obtained  CONTRAST:    Gadavist:     7 cc administered  ;  3 cc discarded    COMPARISON:    No relevant prior imaging studies are available for review.    FINDINGS:    LUMBAR VERTEBRA AND ALIGNMENT:  Lumbar vertebral alignment is preserved.   Lumbar vertebral body heights   are maintained.  Marrow signal intensity within lumbar vertebra is   intact.   No osseous expansion or epidural disease is identified.    SACRUM:   The sacrum appears intact.  VISUALIZED PELVIC BONES:   The visualized pelvic bones are significant   for a focal well-circumscribed lesion within the left iliac bone low   signal intensity on the T1-weighted images. This lesion is noted to be   outside the anatomic curvature and T2 sagittal and axial imaging. On CT   the lesion is noted to blend with adjacent trabecula.  SACROILIAC JOINTS:  The sacroiliac joints are incompletely visualized, as   seen intact.    PARASPINAL SOFT TISSUES:    No lesion is identified.  VISUALIZED ABDOMINAL AND PELVIC SOFT TISSUES:   No lesion is identified.    LOWER THORACIC SPINE:  Levels adequately evaluated:   T12  No significant abnormality in the visualized lower thoracic spine    LUMBAR INTERVERTEBRAL DISC LEVELS:    General comments: The L5-S1 intervertebral discs demonstrates   differential disc height loss and signal intensity loss on the long TR   images reflecting disc degeneration. The remaining lumbar intervertebral   discs maintain preserved heights. These intervertebral disc demonstrate   more limited degenerative signal intensity loss on the long TR images.   Mild disc bulging is present at multiple lumbar levels result in only   minimal deformity of the ventral thecal sac.    T12-L1:   No central canal or foraminal stenosis.  L1-L2:    No central canal or foraminal stenosis.  L2-L3:    No central canal or foraminal stenosis.  L3-L4:    No central canal or foraminal stenosis.  L4-L5:    No central canal or foraminal stenosis.  L5-S1:   Small left central disc protrusion effaces ventral epidural fat   and extends slightly downward in the canal at the S1 superior endplate.   The distal thecal sac and exiting S1 nerve roots remain intact. The   neural foramina are mildly narrowed by the disc bulging and facet   arthropathy.    CNS STRUCTURES:  The distal cord maintains intact morphology and signal intensity. The   conus is  normally positioned at L1.   Nerve roots of the cauda equina   appear intact.      IMPRESSION:    1. L5-S1:   Small left central disc protrusion  (disc herniation)    2. Left iliac lesion such as bone island.    --- End of Report ---            CARIN PARKER MD; Attending Radiologist  This document has been electronically signed. Oct 30 2024  2:22PM    < end of copied text >    < from: CT Lumbar Spine No Cont (10.29.24 @ 22:51) >    ACC: [id number] EXAM:  CT LUMBAR SPINE   ORDERED BY: BEATRICE STAUFFER     PROCEDURE DATE:  10/29/2024      INTERPRETATION:  CT LUMBAR SPINE:    CLINICAL INDICATION: Radiculopathic pain    TECHNIQUE: CT of the lumbar spine was performed without the  administration of intravenous contrast, according to standard protocol.   3-D reconstructions were obtained.    COMPARISON: None    FINDINGS:    ALIGNMENT: Straightening of the spine without listhesis.    VERTEBRAE: The vertebral bodies are normal in height. There is no   fracture identified. There is a 1.8 cm sclerotic lesion involving the   left iliac bone, abutting the left sacroiliac joint.    DISCS: The disc spaces are maintained.    EVALUATION OF INDIVIDUAL LEVELS DEMONSTRATES: See below:    T12-L1: No significant spinal canal stenosis or neuroforaminal narrowing.    L1-L2: Shallow disc bulge present, impinging upon the ventral thecal sac,   without resulting in significant spinal canal stenosis or neuroforaminal   narrowing.    L2-L3: Broad-based disc bulge present, without significant spinal canal   stenosis. There is moderate right and mild left neuroforaminal narrowing.    L3-L4: Broad-based disc bulge present, resulting in mild spinal canal   stenosis and moderate right and mild left neuroforaminal narrowing.    L4-L5: Shallow disc bulge present, resulting in mild spinal canal   stenosis and mild bilateral neuroforaminal narrowing.    L5-S1: Central calcified disc protrusion identified, without significant   spinal canal stenosis or neuroforaminal narrowing.    PARAVERTEBRAL SOFT TISSUES: The visualized paravertebral soft tissues   appear within normal limits.      IMPRESSION:  No acute traumatic injury to the lumbar spine.    Multilevel degenerative changes, described in detail above, with moderate   right-sided neuroforaminal narrowing suggested at and L2-L3 and L3-L4.   MRI lumbar spine could be considered if symptoms persist.    --- End of Report ---     ANITA HERNANDEZ MD; Attending Radiologist  Thisdocument has been electronically signed. Oct 30 2024 12:26AM    < end of copied text >    ORT Score -   Family Hx of substance abuse	Female	      Male  Alcohol 	                                           1                     3  Illegal drugs	                                   2                     3  Rx drugs                                           4 	                  4  Personal Hx of substance abuse		  Alcohol 	                                          3	                  3  Illegal drugs                                     4	                  4  Rx drugs                                            5 	                  5  Age between 16- 45 years	           1                     1  hx preadolescent sexual abuse	   3 	                  0  Psychological disease		  ADD, OCD, bipolar, schizophrenia   2	          2  Depression                                           1 	          1  Total: 0    a score of 3 or lower indicates low risk for opioid abuse		  a score of 4-7 indicates moderate risk for opioid abuse		  a score of 8 or higher indicates high risk for opioid abuse  	  REVIEW OF SYSTEMS:  CONSTITUTIONAL: No fever or fatigue  HEENT:  No difficulty hearing, no change in vision  NECK: No pain or stiffness  RESPIRATORY: No cough, wheezing, chills or hemoptysis; No shortness of breath  CARDIOVASCULAR: No chest pain, palpitations, dizziness, or leg swelling  GASTROINTESTINAL: No loss of appetite, decreased PO intake. No abdominal or epigastric pain. No nausea, vomiting; No diarrhea + hx constipation.   GENITOURINARY: No dysuria, frequency, hematuria, retention. Hx of stress urinary incontinence  MUSCULOSKELETAL: + upper and lower back and pain; + BLE pain, no upper motor strength weakness, + lower extremity motor weakness due to pain, no saddle anesthesia, bowel/bladder incontinence, no falls   NEURO: No headaches, + right leg numbness    PHYSICAL EXAM:  GENERAL:  Alert & Oriented X4, cooperative, NAD,  Turkish speaking  RESPIRATORY: Respirations even and unlabored. Clear to auscultation bilaterally  CARDIOVASCULAR: Normal S1/S2, regular rate and rhythm  GASTROINTESTINAL:  Soft, Nontender, Nondistended; Bowel sounds present  PERIPHERAL VASCULAR:  Extremities warm. 2+ Peripheral Pulses, No cyanosis, No calf tenderness  MUSCULOSKELETAL: Motor Strength 5/5 B/L upper/lower extremities; moves all extremities equally against gravity; negative SLR; + generalized upper back tenderness, no lumbar back tenderness  SKIN: Warm, dry, intact.     Risk factors associated with adverse outcomes related to opioid treatment  [ ] Concurrent benzodiazepine use  [ ] History/ Active substance use or alcohol use disorder  [ ] Psychiatric co-morbidity  [ ] Sleep apnea  [ ] COPD  [ ] BMI> 35  [ ] Liver dysfunction  [ ] Renal dysfunction  [ ] CHF  [ ] Smoker  [ ] Age > 60 years    [x]  [place]  Reviewed and Copied to Chart. See below.    Plan of care and goal oriented pain management treatment options were discussed with patient and /or primary care giver; all questions and concerns were addressed and care was aligned with patient's wishes.    Educated patient on goal oriented pain management treatment options     10-31-24 @ 10:27